# Patient Record
Sex: FEMALE | Race: WHITE | Employment: UNEMPLOYED | URBAN - METROPOLITAN AREA
[De-identification: names, ages, dates, MRNs, and addresses within clinical notes are randomized per-mention and may not be internally consistent; named-entity substitution may affect disease eponyms.]

---

## 2019-01-23 ENCOUNTER — OFFICE VISIT (OUTPATIENT)
Dept: FAMILY MEDICINE CLINIC | Age: 34
End: 2019-01-23

## 2019-01-23 VITALS
HEART RATE: 81 BPM | RESPIRATION RATE: 18 BRPM | OXYGEN SATURATION: 96 % | DIASTOLIC BLOOD PRESSURE: 67 MMHG | SYSTOLIC BLOOD PRESSURE: 111 MMHG | HEIGHT: 62 IN | TEMPERATURE: 98.5 F | BODY MASS INDEX: 43.06 KG/M2 | WEIGHT: 234 LBS

## 2019-01-23 DIAGNOSIS — Z00.00 ROUTINE GENERAL MEDICAL EXAMINATION AT A HEALTH CARE FACILITY: ICD-10-CM

## 2019-01-23 DIAGNOSIS — J45.20 MILD INTERMITTENT ASTHMA WITHOUT COMPLICATION: Primary | ICD-10-CM

## 2019-01-23 PROBLEM — M79.7 FIBROMYALGIA SYNDROME: Status: ACTIVE | Noted: 2017-05-31

## 2019-01-23 PROBLEM — G43.009 MIGRAINE WITHOUT AURA AND WITHOUT STATUS MIGRAINOSUS, NOT INTRACTABLE: Status: ACTIVE | Noted: 2017-09-12

## 2019-01-23 PROBLEM — E66.01 OBESITY, MORBID (HCC): Status: ACTIVE | Noted: 2019-01-23

## 2019-01-23 RX ORDER — BUTALBITAL, ACETAMINOPHEN AND CAFFEINE 300; 40; 50 MG/1; MG/1; MG/1
CAPSULE ORAL
COMMUNITY
Start: 2019-01-09 | End: 2019-11-04

## 2019-01-23 RX ORDER — OLOPATADINE HYDROCHLORIDE 600 UG/1
SPRAY, METERED NASAL
Refills: 1 | COMMUNITY
Start: 2019-01-07

## 2019-01-23 RX ORDER — ALBUTEROL SULFATE 90 UG/1
2 AEROSOL, METERED RESPIRATORY (INHALATION)
Qty: 1 INHALER | Refills: 1 | Status: SHIPPED | OUTPATIENT
Start: 2019-01-23

## 2019-01-23 RX ORDER — ALBUTEROL SULFATE 90 UG/1
2 AEROSOL, METERED RESPIRATORY (INHALATION)
COMMUNITY
Start: 2017-10-15 | End: 2019-01-23 | Stop reason: SDUPTHER

## 2019-01-23 NOTE — PROGRESS NOTES
Lauren Biggs is a 37 y.o. female (: 1985) presenting to address:medication refills, fatigue x3 months Chief Complaint Patient presents with  Medication Refill  Fatigue x3 months Vitals:  
 19 1426 BP: 111/67 Pulse: 81 Resp: 18 Temp: 98.5 °F (36.9 °C) TempSrc: Oral  
SpO2: 96% Weight: 234 lb (106.1 kg) Height: 5' 2\" (1.575 m) PainSc:   7 PainLoc: Generalized LMP: 2019 Hearing/Vision: No exam data present Learning Assessment:  
No flowsheet data found. Depression Screening: PHQ over the last two weeks 2019 PHQ Not Done Active Diagnosis of Depression or Bipolar Disorder Little interest or pleasure in doing things More than half the days Feeling down, depressed, irritable, or hopeless More than half the days Total Score PHQ 2 4 Fall Risk Assessment:  
No flowsheet data found. Abuse Screening: No flowsheet data found. Coordination of Care Questionaire: 1. Have you been to the ER, urgent care clinic since your last visit? Hospitalized since your last visit? no 
 
2. Have you seen or consulted any other health care providers outside of the [address] [place] [address] since your last visit? Include any pap smears or colon screening. ENT, GYN, Urology, Psych, Neuro Advanced Directive: 1. Do you have an Advanced Directive? no 
 
2. Would you like information on Advanced Directives? No 
 
Patient declined flu vaccine

## 2019-01-23 NOTE — PROGRESS NOTES
Subjective:  
  
Nilton Masters is an 37 y.o. female who for an asthma follow-up visit, not currently in exacerbation. Asthma symptoms occur: infrequently. Wheezing when present is described as mild and easily relieved with rescue bronchodilator. Observed precipitants include cold air, infection, pollens and none specifically noted. Current limitations in activity from asthma: none. Number of days of school or work missed in the last month: 0. Frequency of use of quick-relief meds: 1-2 times monthly. Regimen compliance: The patient reports adherence to this regimen. Patient does smoke cigarettes. Review of Systems Pertinent items are noted in HPI. Objective:  
 
Visit Vitals /67 (BP 1 Location: Right arm, BP Patient Position: Sitting) Pulse 81 Temp 98.5 °F (36.9 °C) (Oral) Resp 18 Ht 5' 2\" (1.575 m) Wt 234 lb (106.1 kg) LMP 01/08/2019 SpO2 96% BMI 42.80 kg/m² Oxygens Saturation 96% on  room air General:  alert, cooperative, no distress HEENT:  ENT exam normal, no neck nodes or sinus tenderness Lungs: clear to auscultation bilaterally Heart:  regular rate and rhythm, S1, S2 normal, no murmur, click, rub or gallop Abdomen: soft, non-tender. Bowel sounds normal. No masses,  no organomegaly Extremities: extremities normal, atraumatic, no cyanosis or edema Assessment/Plan: Intermittent RAD, doing well on current treatment. Reviewed treatment goals of prevention of symptoms. Discussed medication dosage, use, side effects, and goals of treatment in detail. Arbutus Ring Continue all allergy meds as prescribed by ENT refilled albuterol 
rtc as needed Labs for cpe which is due now Encounter Diagnoses Name Primary?  Mild intermittent asthma without complication Yes  Routine general medical examination at a health care facility Orders Placed This Encounter  CBC W/O DIFF  
 METABOLIC PANEL, BASIC  
 HEPATIC FUNCTION PANEL  
 LIPID PANEL  
  TSH 3RD GENERATION  
 HEMOGLOBIN A1C WITH EAG  
 VITAMIN D, 25 HYDROXY  
 butalbital-acetaminophen-caff (FIORICET) -40 mg per capsule  DISCONTD: albuterol (PROVENTIL HFA, VENTOLIN HFA, PROAIR HFA) 90 mcg/actuation inhaler  PATANASE 0.6 % spry  albuterol (PROVENTIL HFA, VENTOLIN HFA, PROAIR HFA) 90 mcg/actuation inhaler Omkar Shaw

## 2019-01-23 NOTE — PATIENT INSTRUCTIONS
Asthma in Adults: Care Instructions Your Care Instructions During an asthma attack, your airways swell and narrow as a reaction to certain things (triggers). This makes it hard to breathe. You may be able to prevent asthma attacks if you avoid the things that set off your asthma symptoms. Keeping your asthma under control and treating symptoms before they get bad can help you avoid severe attacks. If you can control your asthma, you may be able to do all of your normal daily activities. You may also avoid asthma attacks and trips to the hospital. 
Follow-up care is a key part of your treatment and safety. Be sure to make and go to all appointments, and call your doctor if you are having problems. It's also a good idea to know your test results and keep a list of the medicines you take. How can you care for yourself at home? · Follow your asthma action plan so you can manage your symptoms at home. An asthma action plan will help you prevent and control airway reactions and will tell you what to do during an asthma attack. If you do not have an asthma action plan, work with your doctor to build one. · Take your asthma medicine exactly as prescribed. Medicine plays an important role in controlling asthma. Talk to your doctor right away if you have any questions about what to take and how to take it. ? Use your quick-relief medicine when you have symptoms of an attack. Quick-relief medicine often is an albuterol inhaler. Some people need to use quick-relief medicine before they exercise. ? Take your controller medicine every day, not just when you have symptoms. Controller medicine is usually an inhaled corticosteroid. The goal is to prevent problems before they occur. Do not use your controller medicine to try to treat an attack that has already started. It does not work fast enough to help.  
? If your doctor prescribed corticosteroid pills to use during an attack, take them as directed. They may take hours to work, but they may shorten the attack and help you breathe better. ? Keep your quick-relief medicine with you at all times. · Talk to your doctor before using other medicines. Some medicines, such as aspirin, can cause asthma attacks in some people. · Check yourself for asthma symptoms to know which step to follow in your action plan. Watch for things like being short of breath, having chest tightness, coughing, and wheezing. Also notice if symptoms wake you up at night or if you get tired quickly when you exercise. · If you have a peak flow meter, use it to check how well you are breathing. This can help you predict when an asthma attack is going to occur. Then you can take medicine to prevent the asthma attack or make it less severe. · See your doctor regularly. These visits will help you learn more about asthma and what you can do to control it. Your doctor will monitor your treatment to make sure the medicine is helping you. · Keep track of your asthma attacks and your treatment. After you have had an attack, write down what triggered it, what helped end it, and any concerns you have about your asthma action plan. Take your diary when you see your doctor. You can then review your asthma action plan and decide if it is working. · Do not smoke or allow others to smoke around you. Avoid smoky places. Smoking makes asthma worse. If you need help quitting, talk to your doctor about stop-smoking programs and medicines. These can increase your chances of quitting for good. · Learn what triggers an asthma attack for you, and avoid the triggers when you can. Common triggers include colds, smoke, air pollution, dust, pollen, mold, pets, cockroaches, stress, and cold air. · Avoid colds and the flu. Get a pneumococcal vaccine shot. If you have had one before, ask your doctor whether you need a second dose.  Get a flu vaccine every fall. If you must be around people with colds or the flu, wash your hands often. When should you call for help? Call 911 anytime you think you may need emergency care. For example, call if: 
  · You have severe trouble breathing.  
 Call your doctor now or seek immediate medical care if: 
  · Your symptoms do not get better after you have followed your asthma action plan.  
  · You cough up yellow, dark brown, or bloody mucus (sputum).  
 Watch closely for changes in your health, and be sure to contact your doctor if: 
  · Your coughing and wheezing get worse.  
  · You need to use quick-relief medicine on more than 2 days a week (unless it is just for exercise).  
  · You need help figuring out what is triggering your asthma attacks. Where can you learn more? Go to http://winston-lissett.info/. Enter P597 in the search box to learn more about \"Asthma in Adults: Care Instructions. \" Current as of: September 5, 2018 Content Version: 11.9 © 6911-5789 [employer]. Care instructions adapted under license by [employer] (which disclaims liability or warranty for this information). If you have questions about a medical condition or this instruction, always ask your healthcare professional. [address] any warranty or liability for your use of this information.

## 2019-01-24 LAB
25(OH)D3 SERPL-MCNC: 19.4 NG/ML (ref 32–100)
A-G RATIO,AGRAT: 1.8 RATIO (ref 1.1–2.6)
ALBUMIN SERPL-MCNC: 4.2 G/DL (ref 3.5–5)
ALP SERPL-CCNC: 90 U/L (ref 25–115)
ALT SERPL-CCNC: 14 U/L (ref 5–40)
ANION GAP SERPL CALC-SCNC: 17 MMOL/L
AST SERPL W P-5'-P-CCNC: 14 U/L (ref 10–37)
AVG GLU, 10930: 98 MG/DL (ref 91–123)
BILIRUB SERPL-MCNC: 0.2 MG/DL (ref 0.2–1.2)
BILIRUBIN, DIRECT,CBIL: <0.2 MG/DL (ref 0–0.3)
BUN SERPL-MCNC: 8 MG/DL (ref 6–22)
CALCIUM SERPL-MCNC: 8.3 MG/DL (ref 8.4–10.5)
CHLORIDE SERPL-SCNC: 95 MMOL/L (ref 98–110)
CHOLEST SERPL-MCNC: 173 MG/DL (ref 110–200)
CO2 SERPL-SCNC: 27 MMOL/L (ref 20–32)
CREAT SERPL-MCNC: 0.9 MG/DL (ref 0.5–1.2)
ERYTHROCYTE [DISTWIDTH] IN BLOOD BY AUTOMATED COUNT: 15.3 % (ref 10–15.5)
GFRAA, 66117: >60
GFRNA, 66118: >60
GLOBULIN,GLOB: 2.3 G/DL (ref 2–4)
GLUCOSE SERPL-MCNC: 83 MG/DL (ref 70–99)
HBA1C MFR BLD HPLC: 5 % (ref 4.8–5.9)
HCT VFR BLD AUTO: 38.3 % (ref 35.1–46.5)
HDLC SERPL-MCNC: 29 MG/DL (ref 40–59)
HDLC SERPL-MCNC: 6 MG/DL (ref 0–5)
HGB BLD-MCNC: 12.4 G/DL (ref 11.7–15.5)
LDLC SERPL CALC-MCNC: ABNORMAL MG/DL (ref 50–99)
MCH RBC QN AUTO: 31 PG (ref 26–34)
MCHC RBC AUTO-ENTMCNC: 32 G/DL (ref 31–36)
MCV RBC AUTO: 96 FL (ref 80–95)
PLATELET # BLD AUTO: 157 K/UL (ref 140–440)
PMV BLD AUTO: 10.7 FL (ref 9–13)
POTASSIUM SERPL-SCNC: 4.3 MMOL/L (ref 3.5–5.5)
PROT SERPL-MCNC: 6.5 G/DL (ref 6.4–8.3)
RBC # BLD AUTO: 4.01 M/UL (ref 3.8–5.2)
SODIUM SERPL-SCNC: 139 MMOL/L (ref 133–145)
TRIGL SERPL-MCNC: 566 MG/DL (ref 40–149)
TSH SERPL DL<=0.005 MIU/L-ACNC: 1.35 MCU/ML (ref 0.27–4.2)
VLDLC SERPL CALC-MCNC: 113 MG/DL (ref 8–30)
WBC # BLD AUTO: 6.1 K/UL (ref 4–11)

## 2019-01-25 LAB — LDL, DIRECT,DLDL: 64 MG/DL (ref 50–99)

## 2019-01-28 NOTE — PROGRESS NOTES
Please call the patient regarding her abnormal result. Triglycerides are high watch fat in diet and recheck in 3 months. Also Vit D low needs 5000 units daily OTC and recheck in 1 year. Patient advised. Verbalized understanding.  Praceta Rosanne Osorio

## 2019-10-01 ENCOUNTER — OFFICE VISIT (OUTPATIENT)
Dept: FAMILY MEDICINE CLINIC | Age: 34
End: 2019-10-01

## 2019-10-01 VITALS
TEMPERATURE: 97.9 F | WEIGHT: 259 LBS | HEART RATE: 90 BPM | HEIGHT: 62 IN | OXYGEN SATURATION: 97 % | RESPIRATION RATE: 16 BRPM | BODY MASS INDEX: 47.66 KG/M2 | SYSTOLIC BLOOD PRESSURE: 117 MMHG | DIASTOLIC BLOOD PRESSURE: 70 MMHG

## 2019-10-01 DIAGNOSIS — E66.01 OBESITY, MORBID (HCC): Primary | ICD-10-CM

## 2019-10-01 DIAGNOSIS — K21.9 GASTROESOPHAGEAL REFLUX DISEASE, ESOPHAGITIS PRESENCE NOT SPECIFIED: ICD-10-CM

## 2019-10-01 NOTE — PROGRESS NOTES
Mesfin Ojeda is a 37 y.o. female (: 1985) presenting to address:    Chief Complaint   Patient presents with    Weight Loss    Epigastric Pain       Vitals:    10/01/19 1423   BP: 117/70   Pulse: 90   Resp: 16   Temp: 97.9 °F (36.6 °C)   TempSrc: Oral   SpO2: 97%   Weight: 259 lb (117.5 kg)   Height: 5' 2\" (1.575 m)       Hearing/Vision:   No exam data present    Learning Assessment:   No flowsheet data found. Depression Screening:     3 most recent PHQ Screens 10/1/2019   PHQ Not Done -   Little interest or pleasure in doing things Nearly every day   Feeling down, depressed, irritable, or hopeless Nearly every day   Total Score PHQ 2 6     Fall Risk Assessment:   No flowsheet data found. Abuse Screening:   No flowsheet data found. Coordination of Care Questionaire:   1. Have you been to the ER, urgent care clinic since your last visit? Hospitalized since your last visit? NO    2. Have you seen or consulted any other health care providers outside of the [address] [place] [address] since your last visit? Include any pap smears or colon screening. YES GYN and [address]    Advanced Directive:   1. Do you have an Advanced Directive? NO    2. Would you like information on Advanced Directives?  NO

## 2019-10-01 NOTE — PROGRESS NOTES
Julien Martinez is a 37 y.o.  female and presents with    Chief Complaint   Patient presents with    Weight Loss     (flu shot)    Epigastric Pain         Subjective:  Patient presents to discuss her weight. Patient states weight has gone from 200-2 59 over the past 1 year. Patient also with concerns of heartburn and epigastric pain x6 months worsens as she gains weight. Current Outpatient Medications   Medication Sig Dispense Refill    PATANASE 0.6 % spry U 2 SPRAYS IEN BID  1    albuterol (PROVENTIL HFA, VENTOLIN HFA, PROAIR HFA) 90 mcg/actuation inhaler Take 2 Puffs by inhalation every six (6) hours as needed for Wheezing. 1 Inhaler 1    amitriptyline (ELAVIL) 25 mg tablet TK UP TO 4 TS PO QPM PRN  2    diazePAM (VALIUM) 10 mg tablet TK 1 T PO BID  3    metFORMIN (GLUCOPHAGE) 500 mg tablet TK 1 T PO  BID WITH MORNING AND EVENING MEALS  0    mometasone (NASONEX) 50 mcg/actuation nasal spray SHAKE LQ AND U 2 SPRAYS IEN QD  11    montelukast (SINGULAIR) 10 mg tablet TK 1 T PO QD  5    propranolol (INDERAL) 20 mg tablet TK 1 T PO QID  2    venlafaxine-SR (EFFEXOR-XR) 150 mg capsule TK 2 CS PO QAM  3    midodrine HCl (MIDODRINE PO) Take  by mouth.  butalbital-acetaminophen-caff (FIORICET) -40 mg per capsule Take 1 Cap by Mouth Every 4 Hours. Prn migraine; max 2 days per week      ARIPiprazole (ABILIFY) 5 mg tablet TK 1 T PO QHS  2    cyanocobalamin, vitamin B-12, (VITAMIN B-12 PO) Take  by mouth. Allergies   Allergen Reactions    Soma [Carisoprodol] Nausea and Vomiting    Zoloft [Sertraline] Rash       Review of Systems   Constitutional:        + weight gain   Gastrointestinal: Positive for abdominal pain and heartburn.          Objective:  Vitals:    10/01/19 1423   BP: 117/70   Pulse: 90   Resp: 16   Temp: 97.9 °F (36.6 °C)   TempSrc: Oral   SpO2: 97%   Weight: 259 lb (117.5 kg)   Height: 5' 2\" (1.575 m)     General:   Well-groomed, well-nourished, in no distress, pleasant, alert, appropriate    Cardiovasc:   RRR,  no murmur, rubs or gallops. Pulmonary:    Lungs clear bilaterally, no wheezing, rales or rhonchi. Abdomen:   Obese Abdomen soft, normal bowel sounds. No masses, tenderness,    rebound/rigidity or CVA tenderness. No hepatosplenomegaly. Assessment/Plan:      1. Obesity, morbid ([address]  Lengthy discussion with patient on weight loss clinic patient will call for appointment with referral as needed. See back in 3 months for follow-up. 2. Gastroesophageal reflux disease, esophagitis presence not specified  Trial Prilosec OTC see back as needed. I have discussed the diagnosis with the patient and the intended plan as seen in the above orders. The patient has received an after-visit summary and questions were answered concerning future plans. I have discussed medication side effects and warnings with the patient as well. I have reviewed the plan of care with the patient, accepted their input and they are in agreement with the treatment goals. Follow-up and Dispositions    · Return in about 3 months (around 1/1/2020). More than 1/2 of this 15 minute visit was spent face to face in counselling and coordination of care, as described above. This document may have been created with the aid of dictation software. Text may contain errors, particularly phonetic errors.      Jodie Oppenheim FNP-BC

## 2019-10-01 NOTE — PATIENT INSTRUCTIONS

## 2019-10-02 ENCOUNTER — OFFICE VISIT (OUTPATIENT)
Dept: FAMILY MEDICINE CLINIC | Age: 34
End: 2019-10-02

## 2019-10-02 VITALS
RESPIRATION RATE: 18 BRPM | BODY MASS INDEX: 48.03 KG/M2 | DIASTOLIC BLOOD PRESSURE: 73 MMHG | SYSTOLIC BLOOD PRESSURE: 117 MMHG | WEIGHT: 261 LBS | TEMPERATURE: 98 F | HEART RATE: 89 BPM | HEIGHT: 62 IN | OXYGEN SATURATION: 95 %

## 2019-10-02 DIAGNOSIS — R59.9 GLANDS SWOLLEN: ICD-10-CM

## 2019-10-02 DIAGNOSIS — J02.9 SORE THROAT: Primary | ICD-10-CM

## 2019-10-02 LAB
S PYO AG THROAT QL: NEGATIVE
VALID INTERNAL CONTROL?: YES

## 2019-10-02 NOTE — PROGRESS NOTES
Subjective:   Alisson Carpenter is a 37 y.o. female who complains of sore throat and swollen glands for 1 days. She denies a history of shortness of breath and wheezing. Patient does not smoke cigarettes. Relevant PMH: DM.    Objective:      Visit Vitals  /73 (BP 1 Location: Left arm, BP Patient Position: Sitting)   Pulse 89   Temp 98 °F (36.7 °C) (Oral)   Resp 18   Ht 5' 2\" (1.575 m)   Wt 261 lb (118.4 kg)   SpO2 95%   BMI 47.74 kg/m²      Appears in mild to moderate distress. Ears: bilateral TM's and external ear canals normal  Oropharynx: erythematous and no exudate noted   Neck: adenopathy noted cervical  Lungs: clear to auscultation, no wheezes, rales or rhonchi, symmetric air entry  The abdomen is soft without tenderness or hepatosplenomegaly. Rapid Strep test is negative    Assessment/Plan:   viral pharyngitis  Per orders. Gargle, use acetaminophen or other OTC analgesic. See prn. Encounter Diagnoses   Name Primary?  Sore throat Yes    Glands swollen      Orders Placed This Encounter    AMB POC RAPID STREP A   .

## 2019-10-02 NOTE — PATIENT INSTRUCTIONS
Sore Throat: Care Instructions  Your Care Instructions    Infection by bacteria or a virus causes most sore throats. Cigarette smoke, dry air, air pollution, allergies, and yelling can also cause a sore throat. Sore throats can be painful and annoying. Fortunately, most sore throats go away on their own. If you have a bacterial infection, your doctor may prescribe antibiotics. Follow-up care is a key part of your treatment and safety. Be sure to make and go to all appointments, and call your doctor if you are having problems. It's also a good idea to know your test results and keep a list of the medicines you take. How can you care for yourself at home? · If your doctor prescribed antibiotics, take them as directed. Do not stop taking them just because you feel better. You need to take the full course of antibiotics. · Gargle with warm salt water once an hour to help reduce swelling and relieve discomfort. Use 1 teaspoon of salt mixed in 1 cup of warm water. · Take an over-the-counter pain medicine, such as acetaminophen (Tylenol), ibuprofen (Advil, Motrin), or naproxen (Aleve). Read and follow all instructions on the label. · Be careful when taking over-the-counter cold or flu medicines and Tylenol at the same time. Many of these medicines have acetaminophen, which is Tylenol. Read the labels to make sure that you are not taking more than the recommended dose. Too much acetaminophen (Tylenol) can be harmful. · Drink plenty of fluids. Fluids may help soothe an irritated throat. Hot fluids, such as tea or soup, may help decrease throat pain. · Use over-the-counter throat lozenges to soothe pain. Regular cough drops or hard candy may also help. These should not be given to young children because of the risk of choking. · Do not smoke or allow others to smoke around you. If you need help quitting, talk to your doctor about stop-smoking programs and medicines.  These can increase your chances of quitting for good. · Use a vaporizer or humidifier to add moisture to your bedroom. Follow the directions for cleaning the machine. When should you call for help? Call your doctor now or seek immediate medical care if:    · You have new or worse trouble swallowing.     · Your sore throat gets much worse on one side.    Watch closely for changes in your health, and be sure to contact your doctor if you do not get better as expected. Where can you learn more? Go to http://winston-lissett.info/. Enter 062 [id number] in the search box to learn more about \"Sore Throat: Care Instructions. \"  Current as of: October 21, 2018  Content Version: 12.2  © 4572-8763 [employer]. Care instructions adapted under license by [employer] (which disclaims liability or warranty for this information). If you have questions about a medical condition or this instruction, always ask your healthcare professional. [address] any warranty or liability for your use of this information.

## 2019-10-25 ENCOUNTER — OFFICE VISIT (OUTPATIENT)
Dept: FAMILY MEDICINE CLINIC | Age: 34
End: 2019-10-25

## 2019-10-25 VITALS
TEMPERATURE: 98.1 F | BODY MASS INDEX: 47.99 KG/M2 | OXYGEN SATURATION: 96 % | HEART RATE: 86 BPM | HEIGHT: 62 IN | DIASTOLIC BLOOD PRESSURE: 82 MMHG | SYSTOLIC BLOOD PRESSURE: 118 MMHG | RESPIRATION RATE: 22 BRPM | WEIGHT: 260.8 LBS

## 2019-10-25 DIAGNOSIS — R53.82 CHRONIC FATIGUE: ICD-10-CM

## 2019-10-25 DIAGNOSIS — Z23 ENCOUNTER FOR IMMUNIZATION: ICD-10-CM

## 2019-10-25 DIAGNOSIS — K21.9 GASTROESOPHAGEAL REFLUX DISEASE, ESOPHAGITIS PRESENCE NOT SPECIFIED: ICD-10-CM

## 2019-10-25 DIAGNOSIS — R40.0 DAYTIME SOMNOLENCE: Primary | ICD-10-CM

## 2019-10-25 DIAGNOSIS — R06.83 SNORING: ICD-10-CM

## 2019-10-25 RX ORDER — OMEPRAZOLE 40 MG/1
40 CAPSULE, DELAYED RELEASE ORAL DAILY
Qty: 90 CAP | Refills: 1 | Status: SHIPPED | OUTPATIENT
Start: 2019-10-25 | End: 2020-01-13 | Stop reason: SDUPTHER

## 2019-10-25 NOTE — PATIENT INSTRUCTIONS
Gastroesophageal Reflux Disease (GERD): Care Instructions Your Care Instructions Gastroesophageal reflux disease (GERD) is the backward flow of stomach acid into the esophagus. The esophagus is the tube that leads from your throat to your stomach. A one-way valve prevents the stomach acid from moving up into this tube. When you have GERD, this valve does not close tightly enough. If you have mild GERD symptoms including heartburn, you may be able to control the problem with antacids or over-the-counter medicine. Changing your diet, losing weight, and making other lifestyle changes can also help reduce symptoms. Follow-up care is a key part of your treatment and safety. Be sure to make and go to all appointments, and call your doctor if you are having problems. It's also a good idea to know your test results and keep a list of the medicines you take. How can you care for yourself at home? · Take your medicines exactly as prescribed. Call your doctor if you think you are having a problem with your medicine. · Your doctor may recommend over-the-counter medicine. For mild or occasional indigestion, antacids, such as Tums, Gaviscon, Mylanta, or Maalox, may help. Your doctor also may recommend over-the-counter acid reducers, such as Pepcid AC, Tagamet HB, Zantac 75, or Prilosec. Read and follow all instructions on the label. If you use these medicines often, talk with your doctor. · Change your eating habits. ? It's best to eat several small meals instead of two or three large meals. ? After you eat, wait 2 to 3 hours before you lie down. ? Chocolate, mint, and alcohol can make GERD worse. ? Spicy foods, foods that have a lot of acid (like tomatoes and oranges), and coffee can make GERD symptoms worse in some people. If your symptoms are worse after you eat a certain food, you may want to stop eating that food to see if your symptoms get better. · Do not smoke or chew tobacco. Smoking can make GERD worse. If you need help quitting, talk to your doctor about stop-smoking programs and medicines. These can increase your chances of quitting for good. · If you have GERD symptoms at night, raise the head of your bed 6 to 8 inches by putting the frame on blocks or placing a foam wedge under the head of your mattress. (Adding extra pillows does not work.) · Do not wear tight clothing around your middle. · Lose weight if you need to. Losing just 5 to 10 pounds can help. When should you call for help? Call your doctor now or seek immediate medical care if: 
  · You have new or different belly pain.  
  · Your stools are black and tarlike or have streaks of blood.  
 Watch closely for changes in your health, and be sure to contact your doctor if: 
  · Your symptoms have not improved after 2 days.  
  · Food seems to catch in your throat or chest.  
Where can you learn more? Go to http://winston-lissett.info/. Enter U639 in the search box to learn more about \"Gastroesophageal Reflux Disease (GERD): Care Instructions. \" Current as of: November 7, 2018 Content Version: 12.2 © 8284-7250 [employer]. Care instructions adapted under license by [employer] (which disclaims liability or warranty for this information). If you have questions about a medical condition or this instruction, always ask your healthcare professional. [place] [address] any warranty or liability for your use of this information.

## 2019-10-25 NOTE — PROGRESS NOTES
Yanick Cotter is a 37 y.o.  female and presents with    Chief Complaint   Patient presents with    Indigestion     Flu shot today    Fatigue     x one month     Subjective:  Patient presents for follow-up of acid reflux. Patient never took Prilosec due to cost.  Patient did not call office to discuss issues with cost.  Other concerns today increased fatigue patient states she feels fatigued all the time but is worse in the past month no change to cycles, no change to daily routines or habits. Patient does state she has always snored, has had increased weight recently, daytime somnolence with fatigue. Current Outpatient Medications   Medication Sig Dispense Refill    omeprazole (PRILOSEC) 40 mg capsule Take 1 Cap by mouth daily. 90 Cap 1    PATANASE 0.6 % spry U 2 SPRAYS IEN BID  1    albuterol (PROVENTIL HFA, VENTOLIN HFA, PROAIR HFA) 90 mcg/actuation inhaler Take 2 Puffs by inhalation every six (6) hours as needed for Wheezing. 1 Inhaler 1    amitriptyline (ELAVIL) 25 mg tablet TK UP TO 4 TS PO QPM PRN  2    diazePAM (VALIUM) 10 mg tablet TK 1 T PO BID  3    metFORMIN (GLUCOPHAGE) 500 mg tablet TK 1 T PO  BID WITH MORNING AND EVENING MEALS  0    mometasone (NASONEX) 50 mcg/actuation nasal spray SHAKE LQ AND U 2 SPRAYS IEN QD  11    montelukast (SINGULAIR) 10 mg tablet TK 1 T PO QD  5    propranolol (INDERAL) 20 mg tablet TK 1 T PO QID  2    venlafaxine-SR (EFFEXOR-XR) 150 mg capsule TK 2 CS PO QAM  3    butalbital-acetaminophen-caff (FIORICET) -40 mg per capsule Take 1 Cap by Mouth Every 4 Hours. Prn migraine; max 2 days per week      ARIPiprazole (ABILIFY) 5 mg tablet TK 1 T PO QHS  2    cyanocobalamin, vitamin B-12, (VITAMIN B-12 PO) Take  by mouth.  midodrine HCl (MIDODRINE PO) Take  by mouth. Allergies   Allergen Reactions    Soma [Carisoprodol] Nausea and Vomiting    Zoloft [Sertraline] Rash       Review of Systems   Constitutional: Positive for malaise/fatigue.  Negative for chills and fever. HENT:        + snoring   Gastrointestinal: Positive for heartburn. Negative for abdominal pain, constipation, diarrhea, nausea and vomiting. Objective:  Vitals:    10/25/19 1014   BP: 118/82   Pulse: 86   Resp: 22   Temp: 98.1 °F (36.7 °C)   TempSrc: Oral   SpO2: 96%   Weight: 260 lb 12.8 oz (118.3 kg)   Height: 5' 2\" (1.575 m)   PainSc:   0 - No pain   LMP: 10/06/2019     General:   Well-groomed, well-nourished, in mild distress, pleasant, alert, appropriate    Mouth:  MMM, oropharynx without exudate, petechiae or ulcers  Neck:      Neck supple, no swelling, mass or tenderness or thyromegaly  Cardiovasc:   RRR,  no murmur, rubs or gallops. Pulmonary:    Lungs clear bilaterally, no wheezing, rales or rhonchi. Abdomen:   Abdomen soft, normal bowel sounds. No masses, tenderness,    rebound/rigidity or CVA tenderness. No hepatosplenomegaly. Assessment/Plan:      1. Encounter for immunization  - INFLUENZA VIRUS VAC QUAD,SPLIT,PRESV FREE SYRINGE IM    2. Daytime somnolence  3. Snoring  4. Chronic fatigue  5. Obesity  Patient exhibits multiple signs of potential sleep apnea referred for sleep study. Discussed with patient could be contributing to fatigue due to daytime somnolence. Also discussed with patient all previous labs within normal limits for the past 2 years. Repeat labs in 3 months when due in January.  - SLEEP MEDICINE REFERRAL    6. Gastroesophageal reflux disease, esophagitis presence not specified  Patient never trialed Prilosec due to cost will order to see if insurance will pay for prescription. Patient to follow-up in 30 days after trialing medication to see if acid is better controlled if she is feeling better. If not referral to GI. I have discussed the diagnosis with the patient and the intended plan as seen in the above orders. The patient has received an after-visit summary and questions were answered concerning future plans.   I have discussed medication side effects and warnings with the patient as well. I have reviewed the plan of care with the patient, accepted their input and they are in agreement with the treatment goals. Follow-up and Dispositions    · Return in about 3 months (around 1/25/2020). More than 1/2 of this 15 minute visit was spent face to face in counselling and coordination of care, as described above. This document may have been created with the aid of dictation software. Text may contain errors, particularly phonetic errors.      Reina Butcher [facility]

## 2019-10-25 NOTE — PROGRESS NOTES
Izabela Hickman is a 37 y.o. female (: 1985) presenting to address:    Chief Complaint   Patient presents with    Indigestion     Flu shot today    Fatigue     x one month       Vitals:    10/25/19 1014   BP: 118/82   Pulse: 86   Resp: 22   Temp: 98.1 °F (36.7 °C)   TempSrc: Oral   SpO2: 96%   Weight: 260 lb 12.8 oz (118.3 kg)   Height: 5' 2\" (1.575 m)   PainSc:   0 - No pain   LMP: 10/06/2019       Hearing/Vision:   No exam data present    Learning Assessment:     Learning Assessment 10/2/2019   PRIMARY LEARNER Patient   HIGHEST LEVEL OF EDUCATION - PRIMARY LEARNER  > 4 YEARS OF COLLEGE   BARRIERS PRIMARY LEARNER NONE   CO-LEARNER CAREGIVER No   PRIMARY LANGUAGE ENGLISH   LEARNER PREFERENCE PRIMARY DEMONSTRATION   ANSWERED BY patient   RELATIONSHIP SELF     Depression Screening:     3 most recent PHQ Screens 10/25/2019   PHQ Not Done Active Diagnosis of Depression or Bipolar Disorder   Little interest or pleasure in doing things Not at all   Feeling down, depressed, irritable, or hopeless Not at all   Total Score PHQ 2 0     Fall Risk Assessment:   No flowsheet data found. Abuse Screening:   No flowsheet data found. Coordination of Care Questionaire:   1. Have you been to the ER, urgent care clinic since your last visit? Hospitalized since your last visit? NO    2. Have you seen or consulted any other health care providers outside of the [address] [place] [address] since your last visit? Include any pap smears or colon screening. YES  Dr. Radha Venegas    Advanced Directive:   1. Do you have an Advanced Directive? NO    2. Would you like information on Advanced Directives? NO    Flu Immunization/s administered 10/25/2019 by Diana Vallecillo with consent. Patient tolerated procedure well. No reactions noted.

## 2019-11-01 ENCOUNTER — TELEPHONE (OUTPATIENT)
Dept: FAMILY MEDICINE CLINIC | Age: 34
End: 2019-11-01

## 2019-11-01 NOTE — TELEPHONE ENCOUNTER
Patient called and stated she started two medication from her last visit and now she is extremely fatigue and leg swelling. Patient declined OV and just wants advise    Patient wants to know if these could be side effects from medication.     Please advise

## 2019-11-01 NOTE — TELEPHONE ENCOUNTER
Typically neither 1 of these would cause this vitamin D is just a vitamin to help boost vitamin D levels. If patient does not want to take it she does not have to. As far as Prilosec is concerned typically does not cause fatigue if she feels it is causing side effects she can stop it but she will not then no full effects of the medication. She could also switch to over-the-counter Nexium, or Pepcid.   She is uncomfortable with any of that she needs to make an office visit appointment

## 2019-11-04 ENCOUNTER — OFFICE VISIT (OUTPATIENT)
Dept: FAMILY MEDICINE CLINIC | Age: 34
End: 2019-11-04

## 2019-11-04 VITALS
BODY MASS INDEX: 49.32 KG/M2 | HEIGHT: 62 IN | HEART RATE: 72 BPM | DIASTOLIC BLOOD PRESSURE: 70 MMHG | WEIGHT: 268 LBS | TEMPERATURE: 98 F | SYSTOLIC BLOOD PRESSURE: 126 MMHG | OXYGEN SATURATION: 97 % | RESPIRATION RATE: 20 BRPM

## 2019-11-04 DIAGNOSIS — M54.50 CHRONIC LOW BACK PAIN WITHOUT SCIATICA, UNSPECIFIED BACK PAIN LATERALITY: Primary | ICD-10-CM

## 2019-11-04 DIAGNOSIS — G89.29 CHRONIC LOW BACK PAIN WITHOUT SCIATICA, UNSPECIFIED BACK PAIN LATERALITY: Primary | ICD-10-CM

## 2019-11-04 RX ORDER — CHOLECALCIFEROL TAB 125 MCG (5000 UNIT) 125 MCG
TAB ORAL DAILY
COMMUNITY

## 2019-11-04 RX ORDER — AMITRIPTYLINE HYDROCHLORIDE 150 MG/1
TABLET, FILM COATED ORAL
COMMUNITY

## 2019-11-04 NOTE — PROGRESS NOTES
HISTORY OF PRESENT ILLNESS  Brett Brady is a 37 y.o. female. 57-year-old female reporting low back pain for over a year but worse for the past month or so  Previous lumbar spine films were read as unremarkable, thoracic spine views reportedly showed multilevel spondylosis    LOW BACK PAIN   The history is provided by the patient and medical records. Associated symptoms include shortness of breath ( With exertion). Pertinent negatives include no chest pain. Mr#: [id number]      Patient Active Problem List   Diagnosis Code    Fibromyalgia syndrome M79.7    Migraine without aura and without status migrainosus, not intractable G43.009    Obesity, morbid (HCC) E66.01    Gastroesophageal reflux disease K21.9         Current Outpatient Medications:     amitriptyline (ELAVIL) 150 mg tablet, Take  by mouth nightly., Disp: , Rfl:     cholecalciferol, VITAMIN D3, (VITAMIN D3) 5,000 unit tab tablet, Take  by mouth daily. , Disp: , Rfl:     omeprazole (PRILOSEC) 40 mg capsule, Take 1 Cap by mouth daily. , Disp: 90 Cap, Rfl: 1    PATANASE 0.6 % spry, U 2 SPRAYS IEN BID, Disp: , Rfl: 1    albuterol (PROVENTIL HFA, VENTOLIN HFA, PROAIR HFA) 90 mcg/actuation inhaler, Take 2 Puffs by inhalation every six (6) hours as needed for Wheezing., Disp: 1 Inhaler, Rfl: 1    diazePAM (VALIUM) 10 mg tablet, TK 1 T PO BID, Disp: , Rfl: 3    metFORMIN (GLUCOPHAGE) 500 mg tablet, TK 1 T PO  BID WITH MORNING AND EVENING MEALS, Disp: , Rfl: 0    mometasone (NASONEX) 50 mcg/actuation nasal spray, SHAKE LQ AND U 2 SPRAYS IEN QD, Disp: , Rfl: 11    montelukast (SINGULAIR) 10 mg tablet, TK 1 T PO QD, Disp: , Rfl: 5    propranolol (INDERAL) 20 mg tablet, TK 1 T PO QID, Disp: , Rfl: 2    venlafaxine-SR (EFFEXOR-XR) 150 mg capsule, TK 2 CS PO QAM, Disp: , Rfl: 3     Allergies   Allergen Reactions    Soma [Carisoprodol] Nausea and Vomiting    Zoloft [Sertraline] Rash         Review of Systems   Constitutional: Negative for fever and weight loss.   Respiratory: Positive for shortness of breath ( With exertion). Cardiovascular: Positive for leg swelling ( Improves after elevation of the legs). Negative for chest pain and palpitations. Gastrointestinal:        Denies change in bowel habits   Genitourinary: Negative for dysuria, frequency and urgency. Musculoskeletal: Positive for back pain (over a year ago - worse in the last month). Neurological: Positive for sensory change (\"very little feeling in my legs\"). Negative for focal weakness. Visit Vitals  /70 (BP 1 Location: Left arm, BP Patient Position: Sitting)   Pulse 72   Temp 98 °F (36.7 °C) (Oral)   Resp 20   Ht 5' 2\" (1.575 m)   Wt 268 lb (121.6 kg)   LMP 10/06/2019 (Exact Date)   SpO2 97%   BMI 49.02 kg/m²       Physical Exam   Constitutional: She is oriented to person, place, and time. She appears well-developed and well-nourished. 65 pound weight gain since 12/2018, 7 pound weight gain since 10/25/2019   HENT:   Head: Normocephalic. Eyes: Conjunctivae and EOM are normal.   Neck: Neck supple. Cardiovascular: Normal rate, regular rhythm and normal heart sounds. Pulmonary/Chest: Effort normal and breath sounds normal.   Musculoskeletal: She exhibits edema ( Trace distal lower extremity edema bilaterally). Back exam reveals diffuse lumbar tenderness to palpation, negative straight leg raise and NOÉ bilaterally, LE muscle strength grossly intact and symmetrical   Neurological: She is alert and oriented to person, place, and time. Skin: Skin is warm and dry. Psychiatric: She has a normal mood and affect. Her behavior is normal.   Nursing note and vitals reviewed. ASSESSMENT and PLAN    ICD-10-CM ICD-9-CM    1.  Chronic low back pain without sciatica, unspecified back pain laterality M54.5 724.2     G89.29 338.29    Recommend continue with plans to participate in the weight loss program and possibly weight loss surgery subsequent to that  Avoid dietary starch and sugar  Follow low back pain recommendations  Check with insurance carrier about coverage of facilities providing interventional pain management and check back with PCP about possible referral

## 2019-11-04 NOTE — PATIENT INSTRUCTIONS
Recommend continue with plans to participate in the weight loss program and possibly weight loss surgery subsequent to that  Avoid dietary starch and sugar  Follow low back pain recommendations  Check with insurance carrier about coverage facilities providing interventional pain management and check back with PCP about possible referral       Learning About How to Have a Healthy Back  What causes back pain? Back pain is often caused by overuse, strain, or injury. For example, people often hurt their backs playing sports or working in the yard, being jolted in a car accident, or lifting something too heavy. Aging plays a part too. Your bones and muscles tend to lose strength as you age, which makes injury more likely. The spongy discs between the bones of the spine (vertebrae) may suffer from wear and tear and no longer provide enough cushion between the bones. A disc that bulges or breaks open (herniated disc) can press on nerves, causing back pain. In some people, back pain is the result of arthritis, broken vertebrae caused by bone loss (osteoporosis), illness, or a spine problem. Although most people have back pain at one time or another, there are steps you can take to make it less likely. How can you have a healthy back? Reduce stress on your back through good posture  Slumping or slouching alone may not cause low back pain. But after the back has been strained or injured, bad posture can make pain worse. · Sleep in a position that maintains your back's normal curves and on a mattress that feels comfortable. Sleep on your side with a pillow between your knees, or sleep on your back with a pillow under your knees. These positions can reduce strain on your back. · Stand and sit up straight. \"Good posture\" generally means your ears, shoulders, and hips are in a straight line. · If you must stand for a long time, put one foot on a stool, ledge, or box. Switch feet every now and then.   · Sit in a chair that is low enough to let you place both feet flat on the floor with both knees nearly level with your hips. If your chair or desk is too high, use a footrest to raise your knees. Place a small pillow, a rolled-up towel, or a lumbar roll in the curve of your back if you need extra support. · Try a kneeling chair, which helps tilt your hips forward. This takes pressure off your lower back. · Try sitting on an exercise ball. It can rock from side to side, which helps keep your back loose. · When driving, keep your knees nearly level with your hips. Sit straight, and drive with both hands on the steering wheel. Your arms should be in a slightly bent position. Reduce stress on your back through careful lifting  · Squat down, bending at the hips and knees only. If you need to, put one knee to the floor and extend your other knee in front of you, bent at a right angle (half kneeling). · Press your chest straight forward. This helps keep your upper back straight while keeping a slight arch in your low back. · Hold the load as close to your body as possible, at the level of your belly button (navel). · Use your feet to change direction, taking small steps. · Lead with your hips as you change direction. Keep your shoulders in line with your hips as you move. · Set down your load carefully, squatting with your knees and hips only. Exercise and stretch your back  · Do some exercise on most days of the week, if your doctor says it is okay. You can walk, run, swim, or cycle. · Stretch your back muscles. Here are a few exercises to try:  ? Lie on your back, and gently pull one bent knee to your chest. Put that foot back on the floor, and then pull the other knee to your chest.  ? Do pelvic tilts. Lie on your back with your knees bent. Tighten your stomach muscles. Pull your belly button (navel) in and up toward your ribs.  You should feel like your back is pressing to the floor and your hips and pelvis are slightly lifting off the floor. Hold for 6 seconds while breathing smoothly. ? Sit with your back flat against a wall. · Keep your core muscles strong. The muscles of your back, belly (abdomen), and buttocks support your spine. ? Pull in your belly and imagine pulling your navel toward your spine. Hold this for 6 seconds, then relax. Remember to keep breathing normally as you tense your muscles. ? Do curl-ups. Always do them with your knees bent. Keep your low back on the floor, and curl your shoulders toward your knees using a smooth, slow motion. Keep your arms folded across your chest. If this bothers your neck, try putting your hands behind your neck (not your head), with your elbows spread apart. ? Lie on your back with your knees bent and your feet flat on the floor. Tighten your belly muscles, and then push with your feet and raise your buttocks up a few inches. Hold this position 6 seconds as you continue to breathe normally, then lower yourself slowly to the floor. Repeat 8 to 12 times. ? If you like group exercise, try Pilates or yoga. These classes have poses that strengthen the core muscles. Lead a healthy lifestyle  · Stay at a healthy weight to avoid strain on your back. · Do not smoke. Smoking increases the risk of osteoporosis, which weakens the spine. If you need help quitting, talk to your doctor about stop-smoking programs and medicines. These can increase your chances of quitting for good. Where can you learn more? Go to http://winston-lissett.info/. Enter L315 in the search box to learn more about \"Learning About How to Have a Healthy Back. \"  Current as of: June 26, 2019  Content Version: 12.2  © 7461-1324 [employer]. Care instructions adapted under license by [employer] (which disclaims liability or warranty for this information).  If you have questions about a medical condition or this instruction, always ask your healthcare professional. [employer] disclaims any warranty or liability for your use of this information. Learning About Relief for Back Pain  What is back tension and strain? Back strain happens when you overstretch, or pull, a muscle in your back. You may hurt your back in an accident or when you exercise or lift something. Most back pain will get better with rest and time. You can take care of yourself at home to help your back heal.  What can you do first to relieve back pain? When you first feel back pain, try these steps:  · Walk. Take a short walk (10 to 20 minutes) on a level surface (no slopes, hills, or stairs) every 2 to 3 hours. Walk only distances you can manage without pain, especially leg pain. · Relax. Find a comfortable position for rest. Some people are comfortable on the floor or a medium-firm bed with a small pillow under their head and another under their knees. Some people prefer to lie on their side with a pillow between their knees. Don't stay in one position for too long. · Try heat or ice. Try using a heating pad on a low or medium setting, or take a warm shower, for 15 to 20 minutes every 2 to 3 hours. Or you can buy single-use heat wraps that last up to 8 hours. You can also try an ice pack for 10 to 15 minutes every 2 to 3 hours. You can use an ice pack or a bag of frozen vegetables wrapped in a thin towel. There is not strong evidence that either heat or ice will help, but you can try them to see if they help. You may also want to try switching between heat and cold. · Take pain medicine exactly as directed. ? If the doctor gave you a prescription medicine for pain, take it as prescribed. ? If you are not taking a prescription pain medicine, ask your doctor if you can take an over-the-counter medicine. What else can you do? · Stretch and exercise. Exercises that increase flexibility may relieve your pain and make it easier for your muscles to keep your spine in a good, neutral position. And don't forget to keep walking. · Do self-massage. You can use self-massage to unwind after work or school or to energize yourself in the morning. You can easily massage your feet, hands, or neck. Self-massage works best if you are in comfortable clothes and are sitting or lying in a comfortable position. Use oil or lotion to massage bare skin. · Reduce stress. Back pain can lead to a vicious [place]: Distress about the pain tenses the muscles in your back, which in turn causes more pain. Learn how to relax your mind and your muscles to lower your stress. Where can you learn more? Go to http://winstonMedPageTodaylissett.info/. Enter V525 in the search box to learn more about \"Learning About Relief for Back Pain. \"  Current as of: June 26, 2019  Content Version: 12.2  © 6504-3293 [employer]. Care instructions adapted under license by [employer] (which disclaims liability or warranty for this information). If you have questions about a medical condition or this instruction, always ask your healthcare professional. [place] [address] any warranty or liability for your use of this information. Low Back Pain: Exercises  Introduction  Here are some examples of exercises for you to try. The exercises may be suggested for a condition or for rehabilitation. Start each exercise slowly. Ease off the exercises if you start to have pain. You will be told when to start these exercises and which ones will work best for you. How to do the exercises  Press-up    1. Lie on your stomach, supporting your body with your forearms. 2. Press your elbows down into the floor to raise your upper back. As you do this, relax your stomach muscles and allow your back to arch without using your back muscles. As your press up, do not let your hips or pelvis come off the floor. 3. Hold for 15 to 30 seconds, then relax. 4. Repeat 2 to 4 times.     Alternate arm and leg (bird dog) exercise    1. Start on the floor, on your hands and knees. 2. Tighten your belly muscles. 3. Raise one leg off the floor, and hold it straight out behind you. Be careful not to let your hip drop down, because that will twist your trunk. 4. Hold for about 6 seconds, then lower your leg and switch to the other leg. 5. Repeat 8 to 12 times on each leg. 6. Over time, work up to holding for 10 to 30 seconds each time. 7. If you feel stable and secure with your leg raised, try raising the opposite arm straight out in front of you at the same time. Knee-to-chest exercise    1. Lie on your back with your knees bent and your feet flat on the floor. 2. Bring one knee to your chest, keeping the other foot flat on the floor (or keeping the other leg straight, whichever feels better on your lower back). 3. Keep your lower back pressed to the floor. Hold for at least 15 to 30 seconds. 4. Relax, and lower the knee to the starting position. 5. Repeat with the other leg. Repeat 2 to 4 times with each leg. 6. To get more stretch, put your other leg flat on the floor while pulling your knee to your chest.    Curl-ups    1. Lie on the floor on your back with your knees bent at a 90-degree angle. Your feet should be flat on the floor, about 12 inches from your buttocks. 2. Cross your arms over your chest. If this bothers your neck, try putting your hands behind your neck (not your head), with your elbows spread apart. 3. Slowly tighten your belly muscles and raise your shoulder blades off the floor. 4. Keep your head in line with your body, and do not press your chin to your chest.  5. Hold this position for 1 or 2 seconds, then slowly lower yourself back down to the floor. 6. Repeat 8 to 12 times. Pelvic tilt exercise    1. Lie on your back with your knees bent. 2. \"Brace\" your stomach. This means to tighten your muscles by pulling in and imagining your belly button moving toward your spine.  You should feel like your back is pressing to the floor and your hips and pelvis are rocking back. 3. Hold for about 6 seconds while you breathe smoothly. 4. Repeat 8 to 12 times. Heel dig bridging    1. Lie on your back with both knees bent and your ankles bent so that only your heels are digging into the floor. Your knees should be bent about 90 degrees. 2. Then push your heels into the floor, squeeze your buttocks, and lift your hips off the floor until your shoulders, hips, and knees are all in a straight line. 3. Hold for about 6 seconds as you continue to breathe normally, and then slowly lower your hips back down to the floor and rest for up to 10 seconds. 4. Do 8 to 12 repetitions. Hamstring stretch in doorway    1. Lie on your back in a doorway, with one leg through the open door. 2. Slide your leg up the wall to straighten your knee. You should feel a gentle stretch down the back of your leg. 3. Hold the stretch for at least 15 to 30 seconds. Do not arch your back, point your toes, or bend either knee. Keep one heel touching the floor and the other heel touching the wall. 4. Repeat with your other leg. 5. Do 2 to 4 times for each leg. Hip flexor stretch    1. Kneel on the floor with one knee bent and one leg behind you. Place your forward knee over your foot. Keep your other knee touching the floor. 2. Slowly push your hips forward until you feel a stretch in the upper thigh of your rear leg. 3. Hold the stretch for at least 15 to 30 seconds. Repeat with your other leg. 4. Do 2 to 4 times on each side. Wall sit    1. Stand with your back 10 to 12 inches away from a wall. 2. Lean into the wall until your back is flat against it. 3. Slowly slide down until your knees are slightly bent, pressing your lower back into the wall. 4. Hold for about 6 seconds, then slide back up the wall. 5. Repeat 8 to 12 times. Follow-up care is a key part of your treatment and safety.  Be sure to make and go to all appointments, and call your doctor if you are having problems. It's also a good idea to know your test results and keep a list of the medicines you take. Where can you learn more? Go to http://winston-lissett.info/. Enter U480 in the search box to learn more about \"Low Back Pain: Exercises. \"  Current as of: June 26, 2019  Content Version: 12.2  © 9707-9571 [employer]. Care instructions adapted under license by [employer] (which disclaims liability or warranty for this information). If you have questions about a medical condition or this instruction, always ask your healthcare professional. [address] any warranty or liability for your use of this information.

## 2019-11-04 NOTE — PROGRESS NOTES
Brett Brady is a 37 y.o. female (: 1985) presenting to address:    Chief Complaint   Patient presents with    LOW BACK PAIN     c/o severe lumbar pain; received flu vaccine       Vitals:    19 1018   BP: 126/70   Pulse: 72   Resp: 20   Temp: 98 °F (36.7 °C)   TempSrc: Oral   SpO2: 97%   Weight: 268 lb (121.6 kg)   Height: 5' 2\" (1.575 m)   PainSc:  10 - Worst pain ever   PainLoc: Back   LMP: 10/06/2019       Hearing/Vision:   No exam data present    Learning Assessment:     Learning Assessment 10/2/2019   PRIMARY LEARNER Patient   HIGHEST LEVEL OF EDUCATION - PRIMARY LEARNER  > 4 YEARS OF COLLEGE   BARRIERS PRIMARY LEARNER NONE   CO-LEARNER CAREGIVER No   PRIMARY LANGUAGE ENGLISH   LEARNER PREFERENCE PRIMARY DEMONSTRATION   ANSWERED BY patient   RELATIONSHIP SELF     Depression Screening:     3 most recent PHQ Screens 2019   PHQ Not Done -   Little interest or pleasure in doing things Several days   Feeling down, depressed, irritable, or hopeless Several days   Total Score PHQ 2 2     Fall Risk Assessment:   No flowsheet data found. Abuse Screening:   No flowsheet data found. Coordination of Care Questionaire:   1. Have you been to the ER, urgent care clinic since your last visit? Hospitalized since your last visit? NO    2. Have you seen or consulted any other health care providers outside of the [address] [place] [address] since your last visit? Include any pap smears or colon screening. NO    Advanced Directive:   1. Do you have an Advanced Directive? NO    2. Would you like information on Advanced Directives?  NO    .ester

## 2019-11-04 NOTE — TELEPHONE ENCOUNTER
Spoke with patient and gave resulted note.  Patient stated she is going to ER for Lumbar pain, Fatigue, abdominal swelling    I did offer patient appointment here today, but patient declined

## 2019-11-21 ENCOUNTER — OFFICE VISIT (OUTPATIENT)
Dept: FAMILY MEDICINE CLINIC | Age: 34
End: 2019-11-21

## 2019-11-21 VITALS
TEMPERATURE: 97.6 F | OXYGEN SATURATION: 94 % | DIASTOLIC BLOOD PRESSURE: 64 MMHG | BODY MASS INDEX: 47.77 KG/M2 | RESPIRATION RATE: 18 BRPM | HEART RATE: 91 BPM | SYSTOLIC BLOOD PRESSURE: 110 MMHG | WEIGHT: 259.6 LBS | HEIGHT: 62 IN

## 2019-11-21 DIAGNOSIS — G89.29 CHRONIC LOW BACK PAIN WITHOUT SCIATICA, UNSPECIFIED BACK PAIN LATERALITY: ICD-10-CM

## 2019-11-21 DIAGNOSIS — S80.02XA CONTUSION OF LEFT KNEE, INITIAL ENCOUNTER: Primary | ICD-10-CM

## 2019-11-21 DIAGNOSIS — M54.50 CHRONIC LOW BACK PAIN WITHOUT SCIATICA, UNSPECIFIED BACK PAIN LATERALITY: ICD-10-CM

## 2019-11-21 NOTE — PROGRESS NOTES
HISTORY OF PRESENT ILLNESS  Marge Sherman is a 37 y.o. female. Ms. Lynette Berrios reports that she fell out of bed about a week ago banging her left knee which is still uncomfortable although the swelling has gone down. She reports that she is still bothered by her chronic lumbar pain. Knee Pain   The history is provided by the patient and medical records. This is a new problem. Episode onset: About a week ago. LOW BACK PAIN   The history is provided by the patient and medical records. This is a chronic problem. Mr#: [id number]      Patient Active Problem List   Diagnosis Code    Fibromyalgia syndrome M79.7    Migraine without aura and without status migrainosus, not intractable G43.009    Obesity, morbid (HCC) E66.01    Gastroesophageal reflux disease K21.9    Chronic low back pain without sciatica M54.5, G89.29         Current Outpatient Medications:     amitriptyline (ELAVIL) 150 mg tablet, Take  by mouth nightly., Disp: , Rfl:     cholecalciferol, VITAMIN D3, (VITAMIN D3) 5,000 unit tab tablet, Take  by mouth daily. , Disp: , Rfl:     omeprazole (PRILOSEC) 40 mg capsule, Take 1 Cap by mouth daily. , Disp: 90 Cap, Rfl: 1    PATANASE 0.6 % spry, U 2 SPRAYS IEN BID, Disp: , Rfl: 1    albuterol (PROVENTIL HFA, VENTOLIN HFA, PROAIR HFA) 90 mcg/actuation inhaler, Take 2 Puffs by inhalation every six (6) hours as needed for Wheezing., Disp: 1 Inhaler, Rfl: 1    diazePAM (VALIUM) 10 mg tablet, TK 1 T PO BID, Disp: , Rfl: 3    metFORMIN (GLUCOPHAGE) 500 mg tablet, TK 1 T PO  BID WITH MORNING AND EVENING MEALS, Disp: , Rfl: 0    mometasone (NASONEX) 50 mcg/actuation nasal spray, SHAKE LQ AND U 2 SPRAYS IEN QD, Disp: , Rfl: 11    montelukast (SINGULAIR) 10 mg tablet, TK 1 T PO QD, Disp: , Rfl: 5    propranolol (INDERAL) 20 mg tablet, TK 1 T PO QID, Disp: , Rfl: 2    venlafaxine-SR (EFFEXOR-XR) 150 mg capsule, TK 2 CS PO QAM, Disp: , Rfl: 3     Allergies   Allergen Reactions    Soma [Carisoprodol] Nausea and Vomiting    Zoloft [Sertraline] Rash       Review of Systems   Constitutional: Positive for weight loss ( Intentional). Negative for fever. Musculoskeletal: Positive for joint pain (left knee). Neurological: Negative for tingling, sensory change and focal weakness. Visit Vitals  /64 (BP 1 Location: Left arm, BP Patient Position: Sitting)   Pulse 91   Temp 97.6 °F (36.4 °C) (Oral)   Resp 18   Ht 5' 2\" (1.575 m)   Wt 259 lb 9.6 oz (117.8 kg)   LMP 10/30/2019 (Exact Date)   SpO2 94%   BMI 47.48 kg/m²       Physical Exam  Vitals signs and nursing note reviewed. Constitutional:       Appearance: She is well-developed. HENT:      Head: Normocephalic. Eyes:      Conjunctiva/sclera: Conjunctivae normal.   Neck:      Musculoskeletal: Neck supple. Cardiovascular:      Rate and Rhythm: Normal rate and regular rhythm. Heart sounds: Normal heart sounds. Pulmonary:      Effort: Pulmonary effort is normal.      Breath sounds: Normal breath sounds. Musculoskeletal:      Comments: Left knee with no effusion or joint line tenderness, negative anterior drawer, no varus or valgus instability. There is some old appearing ecchymosis and tenderness directly over the patella. Skin:     General: Skin is warm and dry. Neurological:      Mental Status: She is alert and oriented to person, place, and time. Psychiatric:         Behavior: Behavior normal.         ASSESSMENT and PLAN    ICD-10-CM ICD-9-CM    1. Contusion of left knee, initial encounter S80. 02XA 924.11    2. Chronic low back pain without sciatica, unspecified back pain laterality M54.5 724.2     G89.29 338.29    The patient expressed concern that she might have a fractured patella. She was advised that I do not feel her exam is consistent with that but offered to x-ray the knee. She declined the x-ray.   Apply warm wet compresses frequently, avoid painful activity, take OTC analgesics such as ibuprofen (Motrin), naproxen (Aleve) or acetaminophen as needed.

## 2019-11-21 NOTE — PATIENT INSTRUCTIONS
Apply warm wet compresses frequently, avoid painful activity, take OTC analgesics such as ibuprofen (Motrin), naproxen (Aleve) or acetaminophen as needed. Follow-up for new symptoms, worsening symptoms or failure to improve Contusion: Care Instructions Your Care Instructions Contusion is the medical term for a bruise. It is the result of a direct blow or an impact, such as a fall. Contusions are common sports injuries. Most people think of a bruise as a black-and-blue spot. This happens when small blood vessels get torn and leak blood under the skin. But bones, muscles, and organs can also get bruised. This may damage deep tissues but not cause a bruise you can see. The doctor will do a physical exam to find the location of your contusion. You may also have tests to make sure you do not have a more serious injury, such as a broken bone or nerve damage. These may include X-rays or other imaging tests like a CT scan or MRI. Deep-tissue contusions may cause pain and swelling. But if there is no serious damage, they will often get better in a few weeks with home treatment. The doctor has checked you carefully, but problems can develop later. If you notice any problems or new symptoms, get medical treatment right away. Follow-up care is a key part of your treatment and safety. Be sure to make and go to all appointments, and call your doctor if you are having problems. It's also a good idea to know your test results and keep a list of the medicines you take. How can you care for yourself at home? · Put ice or a cold pack on the sore area for 10 to 20 minutes at a time to stop swelling. Put a thin cloth between the ice pack and your skin. · Be safe with medicines. Read and follow all instructions on the label. ? If the doctor gave you a prescription medicine for pain, take it as prescribed.  
? If you are not taking a prescription pain medicine, ask your doctor if you can take an over-the-counter medicine. · If you can, prop up the sore area on pillows as much as possible for the next few days. Try to keep the sore area above the level of your heart. When should you call for help? Call your doctor now or seek immediate medical care if: 
  · Your pain gets worse.  
  · You have new or worse swelling.  
  · You have tingling, weakness, or numbness in the area near the contusion.  
  · The area near the contusion is cold or pale.  
 Watch closely for changes in your health, and be sure to contact your doctor if: 
  · You do not get better as expected. Where can you learn more? Go to http://winston-lissett.info/. Enter V282 in the search box to learn more about \"Contusion: Care Instructions. \" Current as of: June 26, 2019 Content Version: 12.2 © 5889-3760 [employer]. Care instructions adapted under license by [employer] (which disclaims liability or warranty for this information). If you have questions about a medical condition or this instruction, always ask your healthcare professional. [address] any warranty or liability for your use of this information.

## 2019-11-21 NOTE — PROGRESS NOTES
sIabel Cabrera is a 37 y.o. female (: 1985) presenting to address:    Chief Complaint   Patient presents with    Knee Injury     left knee injury from falling out of bed; received flu vaccine       Vitals:    19 1038   BP: 110/64   Pulse: 91   Resp: 18   Temp: 97.6 °F (36.4 °C)   TempSrc: Oral   SpO2: 94%   Weight: 259 lb 9.6 oz (117.8 kg)   Height: 5' 2\" (1.575 m)   PainSc:  10 - Worst pain ever   PainLoc: Knee   LMP: 10/30/2019       Hearing/Vision:   No exam data present    Learning Assessment:     Learning Assessment 10/2/2019   PRIMARY LEARNER Patient   HIGHEST LEVEL OF EDUCATION - PRIMARY LEARNER  > 4 YEARS OF COLLEGE   BARRIERS PRIMARY LEARNER NONE   CO-LEARNER CAREGIVER No   PRIMARY LANGUAGE ENGLISH   LEARNER PREFERENCE PRIMARY DEMONSTRATION   ANSWERED BY patient   RELATIONSHIP SELF     Depression Screening:     3 most recent PHQ Screens 2019   PHQ Not Done -   Little interest or pleasure in doing things Nearly every day   Feeling down, depressed, irritable, or hopeless Nearly every day   Total Score PHQ 2 6     Fall Risk Assessment:   No flowsheet data found. Abuse Screening:   No flowsheet data found. Coordination of Care Questionaire:   1. Have you been to the ER, urgent care clinic since your last visit? Hospitalized since your last visit? NO    2. Have you seen or consulted any other health care providers outside of the [address] [place] [address] since your last visit? Include any pap smears or colon screening. YES, Dr. Aubrey Lopez, ENT    Advanced Directive:   1. Do you have an Advanced Directive? NO    2. Would you like information on Advanced Directives?  NO

## 2019-12-05 ENCOUNTER — DOCUMENTATION ONLY (OUTPATIENT)
Dept: NEUROLOGY | Age: 34
End: 2019-12-05

## 2019-12-05 NOTE — PROGRESS NOTES
Patient contacted office to schedule appointment based on referral. Upon scheduling appt, patient was informed that office was located in [facility] and patient was given next appt date for January. At which time pt informed that she currently had a neurologist which she was unaware if they were able to provide a sleep study. Patient was asked if she was transferring care. Due to insurance policies she would not be able to have two treating neurologist. Informed her Dr. Ilda Pruitt was a general neurologist as well as a sleep specialist for our neurology department and perhaps he could assist with all of her neurology needs. Again she was informed that she would have to transfer care and we would require a referral and prior treatment notes from that neurologist. Patient stated she didn't know but she knew that her neurologist was not going to be able to see her until January as well. Patient was informed that she would need to contact PCP to inform and possibly get appointment with their office. Patient was hesitant and unsure of what she wanted to do because she didn't know if she could get sleep study with their office. I asked patient for current neurologist name and practice information and informed that we could call for her to see if they provided that service. After a long pause of not hearing anything further from patient I asked her how could I assist her further. Patient then stated that I was not allowing her an opportunity to finish her statement. Apologized and asked her to continue. She again stated that she didn't know what she wanted to do. She needed sleep study before January and NICOLE Howe informed her that maybe our office would be able to see her sooner. I informed her that unfortunately at this time, January 6 was our soonest appt and currently [facility] is scheduling into February. After another long pause, I informed Ms. Rodriguez that I would contact NICOLE Nelson's office and inform of the concerns. Patient stated okay and disconnected call. At which time, I called and left a message on nurse line at [facility]. for a return call and giving brief details of what call was in reference to.

## 2019-12-10 ENCOUNTER — TELEPHONE (OUTPATIENT)
Dept: FAMILY MEDICINE CLINIC | Age: 34
End: 2019-12-10

## 2019-12-10 NOTE — TELEPHONE ENCOUNTER
Pt called requesting for a call back from the nurse because she wants to go over some concerns she has in regards to stuff that she say on her [facility]. Please return her call at the earliest convenience.

## 2019-12-12 ENCOUNTER — OFFICE VISIT (OUTPATIENT)
Dept: FAMILY MEDICINE CLINIC | Age: 34
End: 2019-12-12

## 2019-12-12 VITALS
SYSTOLIC BLOOD PRESSURE: 129 MMHG | OXYGEN SATURATION: 94 % | HEIGHT: 62 IN | RESPIRATION RATE: 18 BRPM | WEIGHT: 256 LBS | BODY MASS INDEX: 47.11 KG/M2 | HEART RATE: 88 BPM | TEMPERATURE: 96.4 F | DIASTOLIC BLOOD PRESSURE: 84 MMHG

## 2019-12-12 DIAGNOSIS — R55 SYNCOPE, UNSPECIFIED SYNCOPE TYPE: ICD-10-CM

## 2019-12-12 DIAGNOSIS — S80.02XA CONTUSION OF LEFT KNEE, INITIAL ENCOUNTER: Primary | ICD-10-CM
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
## 2019-12-12 NOTE — TELEPHONE ENCOUNTER
Spoke with patient and she stated her blood work on [facility] my chart is not good. She would like to know why no one has called her about results. I asked patient who ordered the blood work and she stated Dr. Charity Mccann  I asked patient did she contact that office and she stated she doesn't know who that is  ?????    Patient requested that you review results and advise her  I called patient back and asked where was the blood work done and she stated she thinks the ER    Please advise

## 2019-12-12 NOTE — TELEPHONE ENCOUNTER
You can notify the patient that we do not review ER notes unless we are aware that they exists. Patient was seen in the ER multiple times but has not had any ER follow-ups here. Labs look fine to me from 11/27/2019 with the exception of mild shifting in her hemoglobin and hematocrit but nothing unstable. .  Imaging looks fine with the exception of the soft tissue injury which they notified her of the contusion to the knee. And the CT from 12/10/2019 appears to be normal.  I see the patient has a neurologist you should find out if she has any upcoming appointments looks like January 10, 2020? Is neuro following her for the syncopal episodes and falls? I am not sure what concerns she has with the labs? Please request what concerns she has.

## 2019-12-12 NOTE — PROGRESS NOTES
Steven Navarro is a 37 y.o. female (: 1985) presenting to address:    Chief Complaint   Patient presents with   [facility] Follow Up       Vitals:    19 0954   BP: 129/84   Pulse: 88   Resp: 18   Temp: 96.4 °F (35.8 °C)   TempSrc: Oral   SpO2: 94%   Weight: 256 lb (116.1 kg)   Height: 5' 2\" (1.575 m)   PainSc:   9   PainLoc: Head   LMP: 2019       Hearing/Vision:   No exam data present    Learning Assessment:     Learning Assessment 10/2/2019   PRIMARY LEARNER Patient   HIGHEST LEVEL OF EDUCATION - PRIMARY LEARNER  > 4 YEARS OF COLLEGE   BARRIERS PRIMARY LEARNER NONE   CO-LEARNER CAREGIVER No   PRIMARY LANGUAGE ENGLISH   LEARNER PREFERENCE PRIMARY DEMONSTRATION   ANSWERED BY patient   RELATIONSHIP SELF     Depression Screening:     3 most recent PHQ Screens 2019   PHQ Not Done -   Little interest or pleasure in doing things Not at all   Feeling down, depressed, irritable, or hopeless Not at all   Total Score PHQ 2 0     Fall Risk Assessment:   No flowsheet data found. Abuse Screening:   No flowsheet data found. Coordination of Care Questionaire:   1. Have you been to the ER, urgent care clinic since your last visit? Hospitalized since your last visit? YES    2. Have you seen or consulted any other health care providers outside of the [address] [place] [address] since your last visit? Include any pap smears or colon screening. NO    Advanced Directive:   1. Do you have an Advanced Directive? NO    2. Would you like information on Advanced Directives?  NO

## 2019-12-17 NOTE — PROGRESS NOTES
Yanet Del Rio is a 37 y.o.  female and presents with    Chief Complaint   Patient presents with   [facility] Follow Up         Subjective:  Patient presents for hospital follow-up after syncopal episodes. Patient states she [place] has a neurologist and will follow up with them on January 10, 2020. ER notes advised patient to follow-up with PCP for referral to neurology as needed. Patient continues with knee pain after contusion from fall previously seen by other provider in this office x-rays also reviewed from ER. Patient continues with knee pain and swelling. Patient cannot identify any leading cause of syncopal episodes however has had several falls due to syncope and now with knee injury. Current Outpatient Medications   Medication Sig Dispense Refill    amitriptyline (ELAVIL) 150 mg tablet Take  by mouth nightly.  cholecalciferol, VITAMIN D3, (VITAMIN D3) 5,000 unit tab tablet Take  by mouth daily.  omeprazole (PRILOSEC) 40 mg capsule Take 1 Cap by mouth daily. 90 Cap 1    PATANASE 0.6 % spry U 2 SPRAYS IEN BID  1    albuterol (PROVENTIL HFA, VENTOLIN HFA, PROAIR HFA) 90 mcg/actuation inhaler Take 2 Puffs by inhalation every six (6) hours as needed for Wheezing. 1 Inhaler 1    diazePAM (VALIUM) 10 mg tablet TK 1 T PO BID  3    metFORMIN (GLUCOPHAGE) 500 mg tablet TK 1 T PO  BID WITH MORNING AND EVENING MEALS  0    mometasone (NASONEX) 50 mcg/actuation nasal spray SHAKE LQ AND U 2 SPRAYS IEN QD  11    montelukast (SINGULAIR) 10 mg tablet TK 1 T PO QD  5    propranolol (INDERAL) 20 mg tablet TK 1 T PO QID  2    venlafaxine-SR (EFFEXOR-XR) 150 mg capsule TK 2 CS PO QAM  3     Allergies   Allergen Reactions    Soma [Carisoprodol] Nausea and Vomiting    Zoloft [Sertraline] Rash       Review of Systems   Musculoskeletal: Positive for falls and joint pain.    Neurological:        Syncope         Objective:  Vitals:    12/12/19 0954   BP: 129/84   Pulse: 88   Resp: 18   Temp: 96.4 °F (35.8 °C) TempSrc: Oral   SpO2: 94%   Weight: 256 lb (116.1 kg)   Height: 5' 2\" (1.575 m)   PainSc:   9   PainLoc: Head   LMP: 12/12/2019     General:   Well-groomed, well-nourished, in no distress, pleasant, alert, appropriate    Cardiovasc:   RRR,  no murmur, rubs or gallops. Pulmonary:    Lungs clear bilaterally, no wheezing, rales or rhonchi. Neuro:            Reflexes and motor strength normal and symmetric. No focal deficits. MSK:   Limited range of motion with the left knee continued tenderness to palpation, swelling noted. Assessment/Plan:      1. Contusion of left knee, initial encounter  Due to syncopal episode patient had a fall which resulted in contusion of the left knee now with swelling and pain continued patient has declined Ortho referral at this time she also declines physical therapy referral from me states she is doing PT at home    2. Fall, sequela  Advised patient to fall precautions for future and if syncopal episode is impending try to prevent fall if able    3. Syncope, unspecified syncope type  For syncopal episode should follow-up with neurology keep appointment January 10, 2020 as any appointments made here would result in longer timeframes    I have discussed the diagnosis with the patient and the intended plan as seen in the above orders. The patient has received an after-visit summary and questions were answered concerning future plans. I have discussed medication side effects and warnings with the patient as well. I have reviewed the plan of care with the patient, accepted their input and they are in agreement with the treatment goals. Follow-up and Dispositions    · Return if symptoms worsen or fail to improve. More than 1/2 of this 25 minute visit was spent face to face in counselling and coordination of care, as described above. This document may have been created with the aid of dictation software. Text may contain errors, particularly phonetic errors.      [place] Velma Rhympamela LEWISP-BC

## 2019-12-30 ENCOUNTER — TELEPHONE (OUTPATIENT)
Dept: FAMILY MEDICINE CLINIC | Age: 34
End: 2019-12-30
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
## 2020-01-06 ENCOUNTER — OFFICE VISIT (OUTPATIENT)
Dept: FAMILY MEDICINE CLINIC | Age: 35
End: 2020-01-06

## 2020-01-06 VITALS
HEIGHT: 62 IN | DIASTOLIC BLOOD PRESSURE: 76 MMHG | SYSTOLIC BLOOD PRESSURE: 121 MMHG | RESPIRATION RATE: 16 BRPM | WEIGHT: 263 LBS | HEART RATE: 121 BPM | BODY MASS INDEX: 48.4 KG/M2 | TEMPERATURE: 98.8 F | OXYGEN SATURATION: 96 %

## 2020-01-06 DIAGNOSIS — J02.0 STREP PHARYNGITIS: Primary | ICD-10-CM

## 2020-01-06 DIAGNOSIS — J02.9 SORE THROAT: ICD-10-CM

## 2020-01-06 LAB
S PYO AG THROAT QL: POSITIVE
VALID INTERNAL CONTROL?: YES

## 2020-01-06 RX ORDER — MEDROXYPROGESTERONE ACETATE 10 MG/1
10 TABLET ORAL
Refills: 11 | COMMUNITY
Start: 2019-10-07

## 2020-01-06 RX ORDER — AMOXICILLIN AND CLAVULANATE POTASSIUM 875; 125 MG/1; MG/1
1 TABLET, FILM COATED ORAL 2 TIMES DAILY
Qty: 20 TAB | Refills: 0 | Status: SHIPPED | OUTPATIENT
Start: 2020-01-06 | End: 2020-01-16

## 2020-01-06 NOTE — PROGRESS NOTES
Assessment/Plan:    *Diagnoses and all orders for this visit:    1. Strep pharyngitis  -     amoxicillin-clavulanate (AUGMENTIN) 875-125 mg per tablet; Take 1 Tab by mouth two (2) times a day for 10 days. 2. Sore throat  -     AMB POC RAPID STREP A        The plan was discussed with the patient. The patient verbalized understanding and is in agreement with the plan. All medication potential side effects were discussed with the patient.    -------------------------------------------------------------------------------------------------------------------        Radha Adams is a 38 y.o. female and presents with Sore Throat; Ear Pain (both seen ENT ); Hand Pain (right ); Dizziness; and Leg Swelling (left )          Subjective:  Pt here for illness. Has B/L tubes in ears, sees Dr. Vijaya Hutchinson. She did see him this morning as she has been having pain in her ears, one tube was removed. She has a f/u with him tomorrow. Has a sore throat, RT side>LT. No fevers, no sinus symptoms or cough. Review of Systems - ENT ROS: positive for - sore throat  Respiratory ROS: no cough, shortness of breath, or wheezing  Cardiovascular ROS: no chest pain or dyspnea on exertion  Gastrointestinal ROS: no abdominal pain, change in bowel habits, or black or bloody stools         The problem list was updated as a part of today's visit. Patient Active Problem List   Diagnosis Code    Fibromyalgia syndrome M79.7    Migraine without aura and without status migrainosus, not intractable G43.009    Obesity, morbid (HCC) E66.01    Gastroesophageal reflux disease K21.9    Chronic low back pain without sciatica M54.5, G89.29       The PSH, FH were reviewed. SH:  Social History     Tobacco Use    Smoking status: Former Smoker    Smokeless tobacco: Never Used    Tobacco comment: quit in May   Substance Use Topics    Alcohol use:  Yes    Drug use: Not on file       Medications/Allergies:  Current Outpatient Medications on File Prior to Visit   Medication Sig Dispense Refill    cholecalciferol, VITAMIN D3, (VITAMIN D3) 5,000 unit tab tablet Take  by mouth daily.  omeprazole (PRILOSEC) 40 mg capsule Take 1 Cap by mouth daily. 90 Cap 1    PATANASE 0.6 % spry U 2 SPRAYS IEN BID  1    albuterol (PROVENTIL HFA, VENTOLIN HFA, PROAIR HFA) 90 mcg/actuation inhaler Take 2 Puffs by inhalation every six (6) hours as needed for Wheezing. 1 Inhaler 1    metFORMIN (GLUCOPHAGE) 500 mg tablet TK 1 T PO  BID WITH MORNING AND EVENING MEALS  0    mometasone (NASONEX) 50 mcg/actuation nasal spray SHAKE LQ AND U 2 SPRAYS IEN QD  11    montelukast (SINGULAIR) 10 mg tablet TK 1 T PO QD  5    propranolol (INDERAL) 20 mg tablet TK 1 T PO QID  2    medroxyPROGESTERone (PROVERA) 10 mg tablet Take 10 mg by mouth. Every 14 days of the month  11    amitriptyline (ELAVIL) 150 mg tablet Take  by mouth nightly.  diazePAM (VALIUM) 10 mg tablet TK 1 T PO BID  3    venlafaxine-SR (EFFEXOR-XR) 150 mg capsule TK 2 CS PO QAM  3     No current facility-administered medications on file prior to visit. Allergies   Allergen Reactions    Soma [Carisoprodol] Nausea and Vomiting    Zoloft [Sertraline] Rash         Health Maintenance:   Health Maintenance   Topic Date Due    Pneumococcal 0-64 years (1 of 1 - PPSV23) 01/04/1989    PAP AKA CERVICAL CYTOLOGY  06/08/2014    DTaP/Tdap/Td series (3 - Td) 04/08/2025    Influenza Age 9 to Adult  Completed       Objective:  Visit Vitals  /76   Pulse (!) 121   Temp 98.8 °F (37.1 °C) (Oral)   Resp 16   Ht 5' 2\" (1.575 m)   Wt 263 lb (119.3 kg)   LMP 12/12/2019 (Exact Date)   SpO2 96%   BMI 48.10 kg/m²          Nurses notes and VS reviewed.       Physical Examination: General appearance - ill-appearing  Ears - bilateral TM's and external ear canals normal  Mouth - erythematous  Neck - adenopathy noted RT  Chest - clear to auscultation, no wheezes, rales or rhonchi, symmetric air entry  Heart - normal rate and regular rhythm  Abdomen - soft, nontender, nondistended, no masses or organomegaly        Labwork and Ancillary Studies:    CBC w/Diff  Lab Results   Component Value Date/Time    WBC 6.1 01/24/2019 10:45 AM    HGB 12.4 01/24/2019 10:45 AM    PLATELET 897 70/61/5338 10:45 AM         Basic Metabolic Profile  Lab Results   Component Value Date/Time    Sodium 139 01/24/2019 10:45 AM    Potassium 4.3 01/24/2019 10:45 AM    Chloride 95 (L) 01/24/2019 10:45 AM    CO2 27 01/24/2019 10:45 AM    Anion gap 17.0 01/24/2019 10:45 AM    Glucose 83 01/24/2019 10:45 AM    BUN 8 01/24/2019 10:45 AM    Creatinine 0.9 01/24/2019 10:45 AM    Calcium 8.3 (L) 01/24/2019 10:45 AM         LFT  Lab Results   Component Value Date/Time    ALT (SGPT) 14 01/24/2019 10:45 AM    AST (SGOT) 14 01/24/2019 10:45 AM    Alk. phosphatase 90 01/24/2019 10:45 AM    Bilirubin, direct <0.2 01/24/2019 10:45 AM    Bilirubin, total 0.2 01/24/2019 10:45 AM         Cholesterol  Lab Results   Component Value Date/Time    Cholesterol, total 173 01/24/2019 10:45 AM    HDL Cholesterol 29 (L) 01/24/2019 10:45 AM    LDL,Direct 64 01/24/2019 10:45 AM    LDL, calculated  01/24/2019 10:45 AM      Comment:      Triglyceride >400. LDL cannot be calculated. LDL Cholesterol Direct has been  ordered.       Triglyceride 566 (H) 01/24/2019 10:45 AM

## 2020-01-06 NOTE — PROGRESS NOTES
Maria Luisa Olivia is a 38 y.o. female (: 1985) presenting to address:    Chief Complaint   Patient presents with    Sore Throat    Ear Pain     both seen ENT     Dizziness       Vitals:    20 1051   BP: 121/76   Pulse: (!) 121   Resp: 16   Temp: 98.8 °F (37.1 °C)   TempSrc: Oral   SpO2: 96%   Weight: 263 lb (119.3 kg)   Height: 5' 2\" (1.575 m)   PainSc:  10 - Worst pain ever   PainLoc: Throat   LMP: 2019       Hearing/Vision:   No exam data present    Learning Assessment:     Learning Assessment 10/2/2019   PRIMARY LEARNER Patient   HIGHEST LEVEL OF EDUCATION - PRIMARY LEARNER  > 4 YEARS OF COLLEGE   BARRIERS PRIMARY LEARNER NONE   CO-LEARNER CAREGIVER No   PRIMARY LANGUAGE ENGLISH   LEARNER PREFERENCE PRIMARY DEMONSTRATION   ANSWERED BY patient   RELATIONSHIP SELF     Depression Screening:     3 most recent PHQ Screens 2020   PHQ Not Done -   Little interest or pleasure in doing things Not at all   Feeling down, depressed, irritable, or hopeless Not at all   Total Score PHQ 2 0     Fall Risk Assessment:   No flowsheet data found. Abuse Screening:   No flowsheet data found. Coordination of Care Questionaire:   1. Have you been to the ER, urgent care clinic since your last visit? Hospitalized since your last visit? NO    2. Have you seen or consulted any other health care providers outside of the [address] [place] [address] since your last visit? Include any pap smears or colon screening. NO    Advanced Directive:   1. Do you have an Advanced Directive? NO    2. Would you like information on Advanced Directives?  NO

## 2020-01-06 NOTE — PROGRESS NOTES
Escorted pt out of the office after her sick visit with provider. Pt was seated comfortably in a wheelchair and was escorted to her car door. Pt wanted to walk down from the office but felt lightheaded when standing. Pt called male relative in the elevator and he was going to meet her at the office to drive her home. I offered that pt could wait inside but she wanted to go to her car. Provider made aware.

## 2020-01-06 NOTE — PATIENT INSTRUCTIONS

## 2020-01-13 RX ORDER — OMEPRAZOLE 40 MG/1
40 CAPSULE, DELAYED RELEASE ORAL DAILY
Qty: 90 CAP | Refills: 1 | Status: SHIPPED | OUTPATIENT
Start: 2020-01-13

## 2020-01-13 NOTE — TELEPHONE ENCOUNTER
Requested Prescriptions     Pending Prescriptions Disp Refills    omeprazole (PRILOSEC) 40 mg capsule 90 Cap 1     Sig: Take 1 Cap by mouth daily.      Future Appointments   Date Time Provider Sena Gould   1/29/2020  9:30 AM Anton Arvizu  [facility]

## 2020-02-13 ENCOUNTER — OFFICE VISIT (OUTPATIENT)
Dept: FAMILY MEDICINE CLINIC | Age: 35
End: 2020-02-13

## 2020-02-13 ENCOUNTER — LAB ONLY (OUTPATIENT)
Dept: FAMILY MEDICINE CLINIC | Age: 35
End: 2020-02-13

## 2020-02-13 VITALS
BODY MASS INDEX: 47.11 KG/M2 | HEART RATE: 89 BPM | DIASTOLIC BLOOD PRESSURE: 77 MMHG | HEIGHT: 62 IN | TEMPERATURE: 98.1 F | SYSTOLIC BLOOD PRESSURE: 128 MMHG | OXYGEN SATURATION: 98 % | WEIGHT: 256 LBS | RESPIRATION RATE: 16 BRPM

## 2020-02-13 DIAGNOSIS — M79.18 MUSCULOSKELETAL PAIN: ICD-10-CM

## 2020-02-13 DIAGNOSIS — M79.641 RIGHT HAND PAIN: Primary | ICD-10-CM

## 2020-02-13 DIAGNOSIS — Z00.00 ANNUAL PHYSICAL EXAM: ICD-10-CM

## 2020-02-13 LAB
25(OH)D3 SERPL-MCNC: 37.4 NG/ML (ref 32–100)
A-G RATIO,AGRAT: 1.3 RATIO (ref 1.1–2.6)
ABSOLUTE LYMPHOCYTE COUNT, 10803: 1.5 K/UL (ref 1–4.8)
ALBUMIN SERPL-MCNC: 4.1 G/DL (ref 3.5–5)
ALP SERPL-CCNC: 94 U/L (ref 25–115)
ALT SERPL-CCNC: 26 U/L (ref 5–40)
ANION GAP SERPL CALC-SCNC: 11 MMOL/L
ANTI-DNA (DS) AB QN, 1189: <1 IU/ML
ANTI-DNA (DS) AB, QT., 096343: <1 IU/ML (ref 0–4)
AST SERPL W P-5'-P-CCNC: 27 U/L (ref 10–37)
BASOPHILS # BLD: 0 K/UL (ref 0–0.2)
BASOPHILS NFR BLD: 1 % (ref 0–2)
BILIRUB SERPL-MCNC: 0.2 MG/DL (ref 0.2–1.2)
BILIRUBIN, DIRECT,CBIL: <0.2 MG/DL (ref 0–0.3)
BUN SERPL-MCNC: 11 MG/DL (ref 6–22)
C-REACTIVE PROTEIN, QT, 006627: 0.5 MG/DL (ref 0–0.5)
CALCIUM SERPL-MCNC: 8.8 MG/DL (ref 8.4–10.5)
CCP ANTIBODY IGG,99138601: <0.5 U/ML
CENTROMERE B ANTIBODY, 601143: <0.2 AI
CHLORIDE SERPL-SCNC: 103 MMOL/L (ref 98–110)
CHOLEST SERPL-MCNC: 158 MG/DL (ref 110–200)
CHROMATIN ANTIBODY: <0.2 AI
CO2 SERPL-SCNC: 26 MMOL/L (ref 20–32)
CREAT SERPL-MCNC: 0.8 MG/DL (ref 0.5–1.2)
ENA SS-A AB SER-ACNC: <0.2 AI
ENA SS-B AB SER-ACNC: <0.2 AI
EOSINOPHIL # BLD: 0.2 K/UL (ref 0–0.5)
EOSINOPHIL NFR BLD: 3 % (ref 0–6)
ERYTHROCYTE [DISTWIDTH] IN BLOOD BY AUTOMATED COUNT: 14.2 % (ref 10–15.5)
GFRAA, 66117: >60
GFRNA, 66118: >60
GLOBULIN,GLOB: 3.1 G/DL (ref 2–4)
GLUCOSE SERPL-MCNC: 88 MG/DL (ref 70–99)
GRANULOCYTES,GRANS: 64 % (ref 40–75)
HCT VFR BLD AUTO: 37.5 % (ref 35.1–46.5)
HDLC SERPL-MCNC: 30 MG/DL
HDLC SERPL-MCNC: 5.3 MG/DL (ref 0–5)
HGB BLD-MCNC: 11.7 G/DL (ref 11.7–15.5)
JO1 ANTIBODY, 8107: <0.2 AI
LDL/HDL RATIO,LDHD: 3
LDLC SERPL CALC-MCNC: 90 MG/DL (ref 50–99)
LYMPHOCYTES, LYMLT: 26 % (ref 20–45)
MCH RBC QN AUTO: 28 PG (ref 26–34)
MCHC RBC AUTO-ENTMCNC: 31 G/DL (ref 31–36)
MCV RBC AUTO: 90 FL (ref 81–99)
MONOCYTES # BLD: 0.4 K/UL (ref 0.1–1)
MONOCYTES NFR BLD: 6 % (ref 3–12)
NEUTROPHILS # BLD AUTO: 3.6 K/UL (ref 1.8–7.7)
NON-HDL CHOLESTEROL, 011976: 128 MG/DL
PLATELET # BLD AUTO: 193 K/UL (ref 140–440)
PMV BLD AUTO: 10.2 FL (ref 9–13)
POTASSIUM SERPL-SCNC: 4.2 MMOL/L (ref 3.5–5.5)
PROT SERPL-MCNC: 7.2 G/DL (ref 6.4–8.3)
RBC # BLD AUTO: 4.15 M/UL (ref 3.8–5.2)
RHEUMATOID FACTOR QUANT, IMMUNOTURBIDIMETRIC: <20 IU/ML (ref 0–20)
RNP ABS, 016354: <0.2 AI
SCLERODERMA AB (SCL-70), 601116: <0.2 AI
SED RATE (ESR): 21 MM/HR (ref 0–20)
SMITH ABS, 016362: <0.2 AI
SODIUM SERPL-SCNC: 140 MMOL/L (ref 133–145)
T4 FREE SERPL-MCNC: 0.8 NG/DL (ref 0.9–1.8)
TRIGL SERPL-MCNC: 189 MG/DL (ref 40–149)
TSH SERPL DL<=0.005 MIU/L-ACNC: 4.36 MCU/ML (ref 0.27–4.2)
URATE SERPL-MCNC: 5.8 MG/DL (ref 2.2–7.7)
VLDLC SERPL CALC-MCNC: 38 MG/DL (ref 8–30)
WBC # BLD AUTO: 5.7 K/UL (ref 4–11)

## 2020-02-13 NOTE — PROGRESS NOTES
Assessment/Plan:    *Diagnoses and all orders for this visit:    1. Right hand pain  -     REFERRAL TO HAND SURGERY    2. Annual physical exam  -     CBC WITH AUTOMATED DIFF; Future  -     HEPATIC FUNCTION PANEL; Future  -     LIPID PANEL; Future  -     METABOLIC PANEL, BASIC; Future  -     TSH 3RD GENERATION; Future  -     T4, FREE; Future  -     VITAMIN D, 25 HYDROXY; Future    3. Musculoskeletal pain  -     REFERRAL TO RHEUMATOLOGY  -     DNA AB, DOUBLE STRANDED, IGG; Future  -     C REACTIVE PROTEIN, QT; Future  -     SED RATE (ESR); Future  -     MAVERICK COMPREHENSIVE PANEL; Future  -     RA + CCP ABS; Future  -     URIC ACID; Future        The plan was discussed with the patient. The patient verbalized understanding and is in agreement with the plan. All medication potential side effects were discussed with the patient.    -------------------------------------------------------------------------------------------------------------------        Bola Johnson is a 38 y.o. female and presents with Dizziness; Leg Swelling (not as bad as last visit . ); and Hand Pain (seen at [facility] on 1/22 brought cd with films . )         Subjective:  Pt has some concerns about her labs. She fractured a bone in her RT foot years ago and was walking around the house a few days ago and heard a pop. Foot is painful today but no swelling, can weight bear without any issues. On 1/22/20 had to go to [facility], she fell out of bed and landed on her RT hand, felt an immediate pain, could move the hand but could not  with thumb or pointer. They did xrays and told her that she had evidence of some old fractures but nothing acute. She finds it hard to believe that she does not have a fx because it felt the same way that her foot did. Pt has been having pain in her RT lower leg and in the LT thigh bone. She does not have any formal diagnosis of these issues.   Has been to see a rheumatologist about 3 years ago but they felt she did not have any issues. She is very concerned about her bones. Has had issues with her lumbar spine as well, has been seeing a chiropractor for this. Says that when she stands, you can see the disks moving in her back (?). She also sees Dr. Perlita Soto (a surgical oncologist) who removed a lump from her RT breast a few years ago. Was dx with fibroadenoma. She says tat she is concerned about this as well and plans to return to see him. Is due for repeat mammo as well. Review of Systems - ENT ROS: negative  Respiratory ROS: no cough, shortness of breath, or wheezing  Cardiovascular ROS: no chest pain or dyspnea on exertion  Gastrointestinal ROS: no abdominal pain, change in bowel habits, or black or bloody stools  Musculoskeletal ROS: positive for - joint pain, joint swelling and bone pain is various areas. The problem list was updated as a part of today's visit. Patient Active Problem List   Diagnosis Code    Fibromyalgia syndrome M79.7    Migraine without aura and without status migrainosus, not intractable G43.009    Obesity, morbid (HCC) E66.01    Gastroesophageal reflux disease K21.9    Chronic low back pain without sciatica M54.5, G89.29    Breast fibroadenoma, right D24.1       The PSH, FH were reviewed. SH:  Social History     Tobacco Use    Smoking status: Former Smoker    Smokeless tobacco: Never Used    Tobacco comment: quit in May   Substance Use Topics    Alcohol use: Yes    Drug use: Not on file       Medications/Allergies:  Current Outpatient Medications on File Prior to Visit   Medication Sig Dispense Refill    omeprazole (PRILOSEC) 40 mg capsule Take 1 Cap by mouth daily. 90 Cap 1    albuterol (PROVENTIL HFA, VENTOLIN HFA, PROAIR HFA) 90 mcg/actuation inhaler Take 2 Puffs by inhalation every six (6) hours as needed for Wheezing. 1 Inhaler 1    medroxyPROGESTERone (PROVERA) 10 mg tablet Take 10 mg by mouth.  Every 14 days of the month  11    amitriptyline (ELAVIL) 150 mg tablet Take  by mouth nightly.  cholecalciferol, VITAMIN D3, (VITAMIN D3) 5,000 unit tab tablet Take  by mouth daily.  PATANASE 0.6 % spry U 2 SPRAYS IEN BID  1    diazePAM (VALIUM) 10 mg tablet TK 1 T PO BID  3    metFORMIN (GLUCOPHAGE) 500 mg tablet TK 1 T PO  BID WITH MORNING AND EVENING MEALS  0    mometasone (NASONEX) 50 mcg/actuation nasal spray SHAKE LQ AND U 2 SPRAYS IEN QD  11    montelukast (SINGULAIR) 10 mg tablet TK 1 T PO QD  5    propranolol (INDERAL) 20 mg tablet TK 1 T PO QID  2    venlafaxine-SR (EFFEXOR-XR) 150 mg capsule TK 2 CS PO QAM  3     No current facility-administered medications on file prior to visit. Allergies   Allergen Reactions    Soma [Carisoprodol] Nausea and Vomiting    Zoloft [Sertraline] Rash         Health Maintenance:   Health Maintenance   Topic Date Due    PAP AKA CERVICAL CYTOLOGY  06/08/2014    DTaP/Tdap/Td series (3 - Td) 04/08/2025    Influenza Age 5 to Adult  Completed    Pneumococcal 0-64 years  Aged Out       Objective:  Visit Vitals  /77   Pulse 89   Temp 98.1 °F (36.7 °C) (Oral)   Resp 16   Ht 5' 2\" (1.575 m)   Wt 256 lb (116.1 kg)   LMP 01/17/2020   SpO2 98%   BMI 46.82 kg/m²          Nurses notes and VS reviewed.       Physical Examination: General appearance - alert, well appearing, and in no distress  Chest - clear to auscultation, no wheezes, rales or rhonchi, symmetric air entry  Heart - normal rate, regular rhythm, normal S1, S2, no murmurs, rubs, clicks or gallops          Labwork and Ancillary Studies:    CBC w/Diff  Lab Results   Component Value Date/Time    WBC 6.1 01/24/2019 10:45 AM    HGB 12.4 01/24/2019 10:45 AM    PLATELET 069 78/34/4462 10:45 AM         Basic Metabolic Profile  Lab Results   Component Value Date/Time    Sodium 139 01/24/2019 10:45 AM    Potassium 4.3 01/24/2019 10:45 AM    Chloride 95 (L) 01/24/2019 10:45 AM    CO2 27 01/24/2019 10:45 AM    Anion gap 17.0 01/24/2019 10:45 AM Glucose 83 01/24/2019 10:45 AM    BUN 8 01/24/2019 10:45 AM    Creatinine 0.9 01/24/2019 10:45 AM    Calcium 8.3 (L) 01/24/2019 10:45 AM         LFT  Lab Results   Component Value Date/Time    ALT (SGPT) 14 01/24/2019 10:45 AM    AST (SGOT) 14 01/24/2019 10:45 AM    Alk. phosphatase 90 01/24/2019 10:45 AM    Bilirubin, direct <0.2 01/24/2019 10:45 AM    Bilirubin, total 0.2 01/24/2019 10:45 AM         Cholesterol  Lab Results   Component Value Date/Time    Cholesterol, total 173 01/24/2019 10:45 AM    HDL Cholesterol 29 (L) 01/24/2019 10:45 AM    LDL,Direct 64 01/24/2019 10:45 AM    LDL, calculated  01/24/2019 10:45 AM      Comment:      Triglyceride >400. LDL cannot be calculated. LDL Cholesterol Direct has been  ordered.       Triglyceride 566 (H) 01/24/2019 10:45 AM

## 2020-02-13 NOTE — PROGRESS NOTES
Barabara Dakin is a 38 y.o. female (: 1985) presenting to address:    Chief Complaint   Patient presents with    Dizziness    Leg Swelling     not as bad as last visit .  Hand Pain     seen at [facility] on  brought cd with films . Vitals:    20 1006   BP: 128/77   Pulse: 89   Resp: 16   Temp: 98.1 °F (36.7 °C)   TempSrc: Oral   SpO2: 98%   Weight: 256 lb (116.1 kg)   Height: 5' 2\" (1.575 m)   PainSc:  10 - Worst pain ever   PainLoc: Hand   LMP: 2020       Hearing/Vision:   No exam data present    Learning Assessment:     Learning Assessment 10/2/2019   PRIMARY LEARNER Patient   HIGHEST LEVEL OF EDUCATION - PRIMARY LEARNER  > 4 YEARS OF COLLEGE   BARRIERS PRIMARY LEARNER NONE   CO-LEARNER CAREGIVER No   PRIMARY LANGUAGE ENGLISH   LEARNER PREFERENCE PRIMARY DEMONSTRATION   ANSWERED BY patient   RELATIONSHIP SELF     Depression Screening:     3 most recent PHQ Screens 2020   PHQ Not Done -   Little interest or pleasure in doing things Not at all   Feeling down, depressed, irritable, or hopeless Not at all   Total Score PHQ 2 0     Fall Risk Assessment:   No flowsheet data found. Abuse Screening:   No flowsheet data found. Coordination of Care Questionaire:   1. Have you been to the ER, urgent care clinic since your last visit? Hospitalized since your last visit? YES    2. Have you seen or consulted any other health care providers outside of the [address] [place] [address] since your last visit? Include any pap smears or colon screening. YES    Advanced Directive:   1. Do you have an Advanced Directive? NO    2. Would you like information on Advanced Directives?  NO

## 2020-02-13 NOTE — PATIENT INSTRUCTIONS
Cholesterol and Triglycerides Tests: About These Tests  What are they? Cholesterol and triglycerides tests measure the amount of fats in your blood. These fats have both \"good\" (HDL) and \"bad\" (LDL) cholesterol. Why are these tests done? These tests are done to help find out your risk of a heart attack and stroke. They can help your doctor find out how well medicine is working for some health problems. How can you prepare for these tests? · Your doctor may tell you to fast before your tests. This means that you do not eat or drink anything except water for 9 to 12 hours before the tests. In most cases, you can take your medicines with water the morning of the test.  · Do not eat high-fat foods the night before the tests. · Do not drink alcohol or do intense exercise the night before the tests. · Be sure to tell your doctor about all the over-the-counter and prescription medicines and herbs or other supplements you take. They can affect the results of these tests. What happens during these tests? A health professional takes a sample of your blood. What else should you know about these tests? Your cholesterol levels can help your doctor find out your risk for having a heart attack or stroke. But it's not just about your cholesterol. Your doctor uses your cholesterol levels plus other things to calculate your risk. These include:  · Your blood pressure. · Whether or not you have diabetes. · Your age, sex, and race. · Whether or not you smoke. You and your doctor can talk about whether you need to lower your risk and what treatment is best for you. Where can you learn more? Go to http://winston-[facility]issett.info/. Enter T200 in the search box to learn more about \"Cholesterol and Triglycerides Tests: About These Tests. \"  Current as of: April 9, 2019  Content Version: 12.2  © 2591-9660 [employer].  Care instructions adapted under license by [employer] (which disclaims liability or warranty for this information). If you have questions about a medical condition or this instruction, always ask your healthcare professional. [address] any warranty or liability for your use of this information.

## 2020-02-14 NOTE — PROGRESS NOTES
She has a slight increase in her TSH so I want to repeat this in 4 weeks. TGs are much better compared to a year ago. Still mildly elevated so continue to work on diet and exercise. The rest of her labs are fine. I had also done an auto-immune work up and those labs were fine. She should see the specialists we spoke about.

## 2020-02-14 NOTE — PROGRESS NOTES
I saw this pt yesterday for one visit only, did an extensive work up on her including an auto-immune work up because she was c/o various bone pains and muscular pains. The results were conveyed to her as noted. We tried to reassure her that her labs did not look bad but she insists that they are bad and does not agree with my opinion. We spoke extensively on the visit about her going to see a hand specialist.  And even though her labs appear to be fine, I told her during the visit, I was also sending her to rheumatology for a second opinion about her pain. I am trying to help her the best that I can but she does not feel that I am doing enough.   For this reason, I will need to terminate our relationship as it is no longer intact and there is a lack of trust.

## 2022-03-18 PROBLEM — E66.01 OBESITY, MORBID (HCC): Status: ACTIVE | Noted: 2019-01-23

## 2022-03-19 PROBLEM — M54.50 CHRONIC LOW BACK PAIN WITHOUT SCIATICA: Status: ACTIVE | Noted: 2019-11-04

## 2022-03-19 PROBLEM — M79.7 FIBROMYALGIA SYNDROME: Status: ACTIVE | Noted: 2017-05-31

## 2022-03-19 PROBLEM — G43.009 MIGRAINE WITHOUT AURA AND WITHOUT STATUS MIGRAINOSUS, NOT INTRACTABLE: Status: ACTIVE | Noted: 2017-09-12

## 2022-03-19 PROBLEM — K21.9 GASTROESOPHAGEAL REFLUX DISEASE: Status: ACTIVE | Noted: 2019-10-01

## 2022-03-19 PROBLEM — G89.29 CHRONIC LOW BACK PAIN WITHOUT SCIATICA: Status: ACTIVE | Noted: 2019-11-04

## 2023-01-31 RX ORDER — OMEPRAZOLE 40 MG/1
40 CAPSULE, DELAYED RELEASE ORAL DAILY
COMMUNITY
Start: 2020-01-13

## 2023-01-31 RX ORDER — PROPRANOLOL HYDROCHLORIDE 20 MG/1
TABLET ORAL
COMMUNITY
Start: 2018-12-12

## 2023-01-31 RX ORDER — MOMETASONE FUROATE 50 UG/1
SPRAY, METERED NASAL
COMMUNITY
Start: 2018-11-26

## 2023-01-31 RX ORDER — MONTELUKAST SODIUM 10 MG/1
TABLET ORAL
COMMUNITY
Start: 2018-11-28

## 2023-01-31 RX ORDER — OLOPATADINE HYDROCHLORIDE 665 UG/1
SPRAY NASAL
COMMUNITY
Start: 2019-01-07

## 2023-01-31 RX ORDER — VENLAFAXINE HYDROCHLORIDE 150 MG/1
CAPSULE, EXTENDED RELEASE ORAL
COMMUNITY
Start: 2018-11-26

## 2023-01-31 RX ORDER — ALBUTEROL SULFATE 90 UG/1
2 AEROSOL, METERED RESPIRATORY (INHALATION) EVERY 6 HOURS PRN
COMMUNITY
Start: 2019-01-23

## 2023-01-31 RX ORDER — AMITRIPTYLINE HYDROCHLORIDE 150 MG/1
TABLET, FILM COATED ORAL
COMMUNITY

## 2023-01-31 RX ORDER — DIAZEPAM 10 MG/1
TABLET ORAL
COMMUNITY
Start: 2018-11-26

## 2023-01-31 RX ORDER — MEDROXYPROGESTERONE ACETATE 10 MG/1
10 TABLET ORAL
COMMUNITY
Start: 2019-10-07